# Patient Record
Sex: FEMALE | Race: WHITE | NOT HISPANIC OR LATINO
[De-identification: names, ages, dates, MRNs, and addresses within clinical notes are randomized per-mention and may not be internally consistent; named-entity substitution may affect disease eponyms.]

---

## 2017-01-10 ENCOUNTER — TRANSCRIPTION ENCOUNTER (OUTPATIENT)
Age: 32
End: 2017-01-10

## 2017-05-15 ENCOUNTER — APPOINTMENT (OUTPATIENT)
Dept: OBGYN | Facility: CLINIC | Age: 32
End: 2017-05-15

## 2017-05-15 ENCOUNTER — LABORATORY RESULT (OUTPATIENT)
Age: 32
End: 2017-05-15

## 2017-05-15 VITALS
HEIGHT: 59 IN | WEIGHT: 98.25 LBS | SYSTOLIC BLOOD PRESSURE: 111 MMHG | BODY MASS INDEX: 19.81 KG/M2 | DIASTOLIC BLOOD PRESSURE: 69 MMHG

## 2017-05-22 LAB
CYTOLOGY CVX/VAG DOC THIN PREP: NORMAL
HPV HIGH+LOW RISK DNA PNL CVX: POSITIVE

## 2017-08-14 ENCOUNTER — TRANSCRIPTION ENCOUNTER (OUTPATIENT)
Age: 32
End: 2017-08-14

## 2018-09-27 ENCOUNTER — TRANSCRIPTION ENCOUNTER (OUTPATIENT)
Age: 33
End: 2018-09-27

## 2018-11-19 ENCOUNTER — APPOINTMENT (OUTPATIENT)
Dept: OBGYN | Facility: CLINIC | Age: 33
End: 2018-11-19
Payer: COMMERCIAL

## 2018-11-19 VITALS
SYSTOLIC BLOOD PRESSURE: 113 MMHG | HEIGHT: 59 IN | BODY MASS INDEX: 19.56 KG/M2 | WEIGHT: 97 LBS | HEART RATE: 80 BPM | DIASTOLIC BLOOD PRESSURE: 70 MMHG

## 2018-11-19 DIAGNOSIS — Z87.42 PERSONAL HISTORY OF OTHER DISEASES OF THE FEMALE GENITAL TRACT: ICD-10-CM

## 2018-11-19 PROCEDURE — 99395 PREV VISIT EST AGE 18-39: CPT

## 2018-11-21 LAB — HPV HIGH+LOW RISK DNA PNL CVX: NOT DETECTED

## 2018-11-26 LAB — CYTOLOGY CVX/VAG DOC THIN PREP: NORMAL

## 2019-01-30 ENCOUNTER — TRANSCRIPTION ENCOUNTER (OUTPATIENT)
Age: 34
End: 2019-01-30

## 2019-10-23 ENCOUNTER — TRANSCRIPTION ENCOUNTER (OUTPATIENT)
Age: 34
End: 2019-10-23

## 2019-12-09 ENCOUNTER — LABORATORY RESULT (OUTPATIENT)
Age: 34
End: 2019-12-09

## 2019-12-09 ENCOUNTER — APPOINTMENT (OUTPATIENT)
Dept: OBGYN | Facility: CLINIC | Age: 34
End: 2019-12-09
Payer: COMMERCIAL

## 2019-12-09 VITALS
DIASTOLIC BLOOD PRESSURE: 73 MMHG | WEIGHT: 100 LBS | HEART RATE: 93 BPM | BODY MASS INDEX: 20.16 KG/M2 | SYSTOLIC BLOOD PRESSURE: 108 MMHG | HEIGHT: 59 IN

## 2019-12-09 PROCEDURE — 99395 PREV VISIT EST AGE 18-39: CPT

## 2019-12-09 NOTE — PHYSICAL EXAM
[Awake] : awake [Alert] : alert [Acute Distress] : no acute distress [LAD] : no lymphadenopathy [Thyroid Nodule] : no thyroid nodule [Goiter] : no goiter [Mass] : no breast mass [Axillary LAD] : no axillary lymphadenopathy [Nipple Discharge] : no nipple discharge [Soft] : soft [H/Smegaly] : no hepatosplenomegaly [Tender] : non tender [Distended] : not distended [Oriented x3] : oriented to person, place, and time [Depressed Mood] : not depressed [Flat Affect] : affect not flat [Normal] : cervix [No Bleeding] : there was no active vaginal bleeding [Anteversion] : anteverted [Tenderness] : nontender [Enlarged ___ wks] : not enlarged [Uterine Adnexae] : were not tender and not enlarged [RRR, No Murmurs] : RRR, no murmurs [CTAB] : CTAB

## 2019-12-09 NOTE — CHIEF COMPLAINT
[Annual Visit] : annual visit [FreeTextEntry1] : 35YO P0 LMP 11/18 on LoOvral presents without complaints.

## 2019-12-09 NOTE — HISTORY OF PRESENT ILLNESS
[1 Year Ago] : 1 year ago [Healthy Diet] : a healthy diet [Good] : being in good health [Last Pap ___] : Last cervical pap smear was [unfilled] [Regular Exercise] : regular exercise [Reproductive Age] : is of reproductive age [Contraception] : uses contraception [Sexually Active] : is sexually active [Up to Date] : up to date with ~his/her~ STD screening [Weight Concerns] : no concerns with her weight [Menstrual Problems] : reports normal menses [Pregnancy History] : denies prior pregnancies

## 2019-12-10 LAB — HPV HIGH+LOW RISK DNA PNL CVX: DETECTED

## 2019-12-13 LAB — CYTOLOGY CVX/VAG DOC THIN PREP: NORMAL

## 2020-04-07 ENCOUNTER — RX RENEWAL (OUTPATIENT)
Age: 35
End: 2020-04-07

## 2020-11-10 ENCOUNTER — RX RENEWAL (OUTPATIENT)
Age: 35
End: 2020-11-10

## 2021-05-17 ENCOUNTER — APPOINTMENT (OUTPATIENT)
Dept: OBGYN | Facility: CLINIC | Age: 36
End: 2021-05-17
Payer: COMMERCIAL

## 2021-05-17 ENCOUNTER — LABORATORY RESULT (OUTPATIENT)
Age: 36
End: 2021-05-17

## 2021-05-17 VITALS — WEIGHT: 102 LBS | BODY MASS INDEX: 20.6 KG/M2 | DIASTOLIC BLOOD PRESSURE: 64 MMHG | SYSTOLIC BLOOD PRESSURE: 122 MMHG

## 2021-05-17 VITALS — TEMPERATURE: 97.5 F

## 2021-05-17 PROCEDURE — 99072 ADDL SUPL MATRL&STAF TM PHE: CPT

## 2021-05-17 PROCEDURE — 99395 PREV VISIT EST AGE 18-39: CPT

## 2021-05-17 NOTE — PHYSICAL EXAM
[Appropriately responsive] : appropriately responsive [Alert] : alert [No Acute Distress] : no acute distress [No Lymphadenopathy] : no lymphadenopathy [Regular Rate Rhythm] : regular rate rhythm [No Murmurs] : no murmurs [Clear to Auscultation B/L] : clear to auscultation bilaterally [Soft] : soft [Non-tender] : non-tender [Non-distended] : non-distended [No HSM] : No HSM [No Lesions] : no lesions [No Mass] : no mass [Oriented x3] : oriented x3 [Examination Of The Breasts] : a normal appearance [No Masses] : no breast masses were palpable [Labia Majora] : normal [Labia Minora] : normal [Normal] : normal [Tenderness] : nontender [Normal Position] : in a normal position [Uterine Adnexae] : normal

## 2021-05-17 NOTE — HISTORY OF PRESENT ILLNESS
[Patient reported PAP Smear was normal] : Patient reported PAP Smear was normal [FreeTextEntry1] : 37YO p0 LMP 4/26 on LoOvral presents for checkup without complaints. [PapSmeardate] : 12/19

## 2021-05-25 LAB
CYTOLOGY CVX/VAG DOC THIN PREP: ABNORMAL
HPV HIGH+LOW RISK DNA PNL CVX: DETECTED

## 2021-06-07 ENCOUNTER — APPOINTMENT (OUTPATIENT)
Dept: OBGYN | Facility: CLINIC | Age: 36
End: 2021-06-07
Payer: COMMERCIAL

## 2021-06-07 PROCEDURE — 99072 ADDL SUPL MATRL&STAF TM PHE: CPT

## 2021-06-07 PROCEDURE — 57454 BX/CURETT OF CERVIX W/SCOPE: CPT

## 2021-06-07 NOTE — PROCEDURE
[Colposcopy] : Colposcopy  [Time out performed] : Pre-procedure time out performed.  Patient's name, date of birth and procedure confirmed. [Consent Obtained] : Consent obtained [Risks] : risks [Benefits] : benefits [Alternatives] : alternatives [Patient] : patient [Infection] : infection [Bleeding] : bleeding [Allergic Reaction] : allergic reaction [LGSIL] : LGSIL [HPV High Risk] : HPV high risk [Colposcopy Adequate] : colposcopy adequate [Pap Performed] : pap not performed [SCI Fully Visualized] : SCI not fully visualized [ECC Performed] : ECC performed [Hemostasis Obtained] : Hemostasis obtained [Tolerated Well] : the patient tolerated the procedure well [de-identified] : AWE on bottom lip of cervix [de-identified] : 8 o'clock\par ECC

## 2021-06-11 LAB — CORE LAB BIOPSY: NORMAL

## 2021-12-17 ENCOUNTER — TRANSCRIPTION ENCOUNTER (OUTPATIENT)
Age: 36
End: 2021-12-17

## 2022-05-09 ENCOUNTER — APPOINTMENT (OUTPATIENT)
Dept: OBGYN | Facility: CLINIC | Age: 37
End: 2022-05-09

## 2022-06-20 ENCOUNTER — APPOINTMENT (OUTPATIENT)
Dept: OBGYN | Facility: CLINIC | Age: 37
End: 2022-06-20

## 2022-08-28 ENCOUNTER — NON-APPOINTMENT (OUTPATIENT)
Age: 37
End: 2022-08-28

## 2022-10-24 ENCOUNTER — LABORATORY RESULT (OUTPATIENT)
Age: 37
End: 2022-10-24

## 2022-10-24 ENCOUNTER — APPOINTMENT (OUTPATIENT)
Dept: OBGYN | Facility: CLINIC | Age: 37
End: 2022-10-24

## 2022-10-24 ENCOUNTER — TRANSCRIPTION ENCOUNTER (OUTPATIENT)
Age: 37
End: 2022-10-24

## 2022-10-24 VITALS
HEIGHT: 59 IN | SYSTOLIC BLOOD PRESSURE: 111 MMHG | BODY MASS INDEX: 21.04 KG/M2 | WEIGHT: 104.38 LBS | DIASTOLIC BLOOD PRESSURE: 71 MMHG

## 2022-10-24 DIAGNOSIS — Z01.419 ENCOUNTER FOR GYNECOLOGICAL EXAMINATION (GENERAL) (ROUTINE) W/OUT ABNORMAL FINDINGS: ICD-10-CM

## 2022-10-24 PROCEDURE — 99395 PREV VISIT EST AGE 18-39: CPT

## 2022-10-24 PROCEDURE — 81025 URINE PREGNANCY TEST: CPT

## 2022-10-24 NOTE — HISTORY OF PRESENT ILLNESS
[FreeTextEntry1] : 36YO P0 LMP 9/15 (-)UCG today, trying to conceive for a month or 2 (self D/C'd OCPs in August) no complaints. [PapSmeardate] : 2021

## 2022-10-25 LAB
HCG UR QL: NEGATIVE
QUALITY CONTROL: YES

## 2022-11-01 LAB
CYTOLOGY CVX/VAG DOC THIN PREP: ABNORMAL
HPV HIGH+LOW RISK DNA PNL CVX: DETECTED

## 2022-11-23 ENCOUNTER — APPOINTMENT (OUTPATIENT)
Dept: OBGYN | Facility: CLINIC | Age: 37
End: 2022-11-23

## 2022-11-23 VITALS — SYSTOLIC BLOOD PRESSURE: 122 MMHG | DIASTOLIC BLOOD PRESSURE: 80 MMHG

## 2022-11-23 DIAGNOSIS — R87.612 LOW GRADE SQUAMOUS INTRAEPITHELIAL LESION ON CYTOLOGIC SMEAR OF CERVIX (LGSIL): ICD-10-CM

## 2022-11-23 DIAGNOSIS — B97.7 PAPILLOMAVIRUS AS THE CAUSE OF DISEASES CLASSIFIED ELSEWHERE: ICD-10-CM

## 2022-11-23 LAB
HCG UR QL: NEGATIVE
QUALITY CONTROL: YES

## 2022-11-23 PROCEDURE — 57454 BX/CURETT OF CERVIX W/SCOPE: CPT

## 2022-11-23 PROCEDURE — 81025 URINE PREGNANCY TEST: CPT

## 2022-11-23 NOTE — PROCEDURE
[Colposcopy] : Colposcopy  [Time out performed] : Pre-procedure time out performed.  Patient's name, date of birth and procedure confirmed. [Consent Obtained] : Consent obtained [Risks] : risks [Benefits] : benefits [Alternatives] : alternatives [Patient] : patient [Infection] : infection [Bleeding] : bleeding [Allergic Reaction] : allergic reaction [LGSIL] : LGSIL [HPV High Risk] : HPV high risk [No Premedication] : no premedication [Colposcopy Adequate] : colposcopy adequate [Pap Performed] : pap not performed [SCI Fully Visualized] : SCI not fully visualized [ECC Performed] : ECC performed [Lesion] : lesion seen [Biopsy] : biopsy taken [Hemostasis Obtained] : Hemostasis obtained [Tolerated Well] : the patient tolerated the procedure well [de-identified] : 1 [de-identified] : ECC

## 2022-11-30 ENCOUNTER — NON-APPOINTMENT (OUTPATIENT)
Age: 37
End: 2022-11-30

## 2022-11-30 LAB — CORE LAB BIOPSY: NORMAL

## 2022-12-19 ENCOUNTER — NON-APPOINTMENT (OUTPATIENT)
Age: 37
End: 2022-12-19

## 2023-01-27 ENCOUNTER — APPOINTMENT (OUTPATIENT)
Dept: OBGYN | Facility: CLINIC | Age: 38
End: 2023-01-27
Payer: COMMERCIAL

## 2023-01-27 VITALS — BODY MASS INDEX: 20.02 KG/M2 | SYSTOLIC BLOOD PRESSURE: 102 MMHG | WEIGHT: 99.13 LBS | DIASTOLIC BLOOD PRESSURE: 67 MMHG

## 2023-01-27 DIAGNOSIS — N64.4 MASTODYNIA: ICD-10-CM

## 2023-01-27 DIAGNOSIS — R53.83 OTHER FATIGUE: ICD-10-CM

## 2023-01-27 DIAGNOSIS — N92.6 IRREGULAR MENSTRUATION, UNSPECIFIED: ICD-10-CM

## 2023-01-27 PROCEDURE — 99213 OFFICE O/P EST LOW 20 MIN: CPT | Mod: 25

## 2023-01-27 PROCEDURE — 76830 TRANSVAGINAL US NON-OB: CPT

## 2023-01-27 NOTE — PROCEDURE
[Transvaginal OB Sonogram] : Transvaginal OB Sonogram [Intrauterine Pregnancy] : intrauterine pregnancy [Yolk Sac] : yolk sac present [Fetal Heart] : fetal heart present [CRL: ___ (mm)] : CRL - [unfilled]Umm [Date: ___] : Date: [unfilled] [Current GA by Sonogram: ___ (wks)] : Current GA by Sonogram: [unfilled]Uwks [___ day(s)] : [unfilled] days [Transvaginal OB Sonogram WNL] : Transvaginal OB Sonogram WNL [FreeTextEntry1] : c/w 25d cycle LMP (should be 9+3)

## 2023-01-27 NOTE — PHYSICAL EXAM
[Labia Majora] : normal [Labia Minora] : normal [Normal] : normal [Enlarged ___ wks] : enlarged [unfilled] ~Uweeks [Anteversion] : anteverted [Uterine Adnexae] : normal

## 2023-02-06 ENCOUNTER — APPOINTMENT (OUTPATIENT)
Dept: ANTEPARTUM | Facility: CLINIC | Age: 38
End: 2023-02-06
Payer: COMMERCIAL

## 2023-02-06 ENCOUNTER — ASOB RESULT (OUTPATIENT)
Age: 38
End: 2023-02-06

## 2023-02-06 ENCOUNTER — APPOINTMENT (OUTPATIENT)
Dept: OBGYN | Facility: CLINIC | Age: 38
End: 2023-02-06
Payer: COMMERCIAL

## 2023-02-06 DIAGNOSIS — Z34.00 ENCOUNTER FOR SUPERVISION OF NORMAL FIRST PREGNANCY, UNSPECIFIED TRIMESTER: ICD-10-CM

## 2023-02-06 PROCEDURE — 0501F PRENATAL FLOW SHEET: CPT

## 2023-02-06 PROCEDURE — 76801 OB US < 14 WKS SINGLE FETUS: CPT

## 2023-02-06 PROCEDURE — 76813 OB US NUCHAL MEAS 1 GEST: CPT | Mod: 59

## 2023-02-07 LAB
ABO + RH PNL BLD: NORMAL
BASOPHILS # BLD AUTO: 0.06 K/UL
BASOPHILS NFR BLD AUTO: 0.6 %
BLD GP AB SCN SERPL QL: NORMAL
C TRACH RRNA SPEC QL NAA+PROBE: NOT DETECTED
CMV IGG SERPL QL: <0.2 U/ML
CMV IGG SERPL-IMP: NEGATIVE
EOSINOPHIL # BLD AUTO: 0.15 K/UL
EOSINOPHIL NFR BLD AUTO: 1.4 %
HBV SURFACE AG SER QL: NONREACTIVE
HCT VFR BLD CALC: 40.3 %
HCV AB SER QL: NONREACTIVE
HCV S/CO RATIO: 0.13 S/CO
HGB A MFR BLD: 97.4 %
HGB A2 MFR BLD: 2.6 %
HGB BLD-MCNC: 12.9 G/DL
HGB FRACT BLD-IMP: NORMAL
HIV1+2 AB SPEC QL IA.RAPID: NONREACTIVE
IMM GRANULOCYTES NFR BLD AUTO: 0.4 %
LYMPHOCYTES # BLD AUTO: 2.44 K/UL
LYMPHOCYTES NFR BLD AUTO: 22.7 %
MAN DIFF?: NORMAL
MCHC RBC-ENTMCNC: 29.6 PG
MCHC RBC-ENTMCNC: 32 GM/DL
MCV RBC AUTO: 92.4 FL
MEV IGG FLD QL IA: 217 AU/ML
MEV IGG+IGM SER-IMP: POSITIVE
MONOCYTES # BLD AUTO: 0.65 K/UL
MONOCYTES NFR BLD AUTO: 6 %
N GONORRHOEA RRNA SPEC QL NAA+PROBE: NOT DETECTED
NEUTROPHILS # BLD AUTO: 7.41 K/UL
NEUTROPHILS NFR BLD AUTO: 68.9 %
PLATELET # BLD AUTO: 397 K/UL
RBC # BLD: 4.36 M/UL
RBC # FLD: 13.1 %
RUBV IGG FLD-ACNC: 7.4 INDEX
RUBV IGG SER-IMP: POSITIVE
SOURCE AMPLIFICATION: NORMAL
T PALLIDUM AB SER QL IA: NEGATIVE
TSH SERPL-ACNC: 0.63 UIU/ML
VZV AB TITR SER: POSITIVE
VZV IGG SER IF-ACNC: 965.1 INDEX
WBC # FLD AUTO: 10.75 K/UL

## 2023-02-10 LAB
B19V IGG SER QL IA: 4.45 INDEX
B19V IGG+IGM SER-IMP: NORMAL
B19V IGG+IGM SER-IMP: POSITIVE
B19V IGM FLD-ACNC: 0.19 INDEX
B19V IGM SER-ACNC: NEGATIVE
BACTERIA UR CULT: NORMAL
LEAD BLD-MCNC: <1 UG/DL

## 2023-02-13 LAB — CFTR MUT TESTED BLD/T: NEGATIVE

## 2023-02-14 LAB
AR GENE MUT ANL BLD/T: NORMAL
FMR1 GENE MUT ANL BLD/T: NORMAL

## 2023-02-15 ENCOUNTER — NON-APPOINTMENT (OUTPATIENT)
Age: 38
End: 2023-02-15

## 2023-02-21 ENCOUNTER — ASOB RESULT (OUTPATIENT)
Age: 38
End: 2023-02-21

## 2023-02-21 ENCOUNTER — APPOINTMENT (OUTPATIENT)
Dept: MATERNAL FETAL MEDICINE | Facility: CLINIC | Age: 38
End: 2023-02-21
Payer: COMMERCIAL

## 2023-02-21 PROCEDURE — 99204 OFFICE O/P NEW MOD 45 MIN: CPT | Mod: 95

## 2023-02-27 ENCOUNTER — NON-APPOINTMENT (OUTPATIENT)
Age: 38
End: 2023-02-27

## 2023-02-27 ENCOUNTER — EMERGENCY (EMERGENCY)
Facility: HOSPITAL | Age: 38
LOS: 1 days | Discharge: HOME | End: 2023-02-27
Attending: EMERGENCY MEDICINE
Payer: COMMERCIAL

## 2023-02-27 VITALS
RESPIRATION RATE: 18 BRPM | HEART RATE: 100 BPM | SYSTOLIC BLOOD PRESSURE: 107 MMHG | HEIGHT: 59 IN | DIASTOLIC BLOOD PRESSURE: 70 MMHG | OXYGEN SATURATION: 99 % | TEMPERATURE: 98 F | WEIGHT: 100.09 LBS

## 2023-02-27 LAB
ALBUMIN SERPL ELPH-MCNC: 4.5 G/DL — SIGNIFICANT CHANGE UP (ref 3.3–5)
ALP SERPL-CCNC: 69 U/L — SIGNIFICANT CHANGE UP (ref 40–120)
ALT FLD-CCNC: 12 U/L — SIGNIFICANT CHANGE UP (ref 10–45)
ANION GAP SERPL CALC-SCNC: 14 MMOL/L — SIGNIFICANT CHANGE UP (ref 5–17)
APPEARANCE UR: CLEAR — SIGNIFICANT CHANGE UP
APTT BLD: 23.3 SEC — LOW (ref 27.5–35.5)
AST SERPL-CCNC: 16 U/L — SIGNIFICANT CHANGE UP (ref 10–40)
BASE EXCESS BLDV CALC-SCNC: -2.8 MMOL/L — LOW (ref -2–3)
BASOPHILS # BLD AUTO: 0.01 K/UL — SIGNIFICANT CHANGE UP (ref 0–0.2)
BASOPHILS NFR BLD AUTO: 0.1 % — SIGNIFICANT CHANGE UP (ref 0–2)
BILIRUB SERPL-MCNC: 0.3 MG/DL — SIGNIFICANT CHANGE UP (ref 0.2–1.2)
BILIRUB UR-MCNC: NEGATIVE — SIGNIFICANT CHANGE UP
BLD GP AB SCN SERPL QL: NEGATIVE — SIGNIFICANT CHANGE UP
BUN SERPL-MCNC: 9 MG/DL — SIGNIFICANT CHANGE UP (ref 7–23)
CA-I SERPL-SCNC: 1.22 MMOL/L — SIGNIFICANT CHANGE UP (ref 1.15–1.33)
CALCIUM SERPL-MCNC: 9.4 MG/DL — SIGNIFICANT CHANGE UP (ref 8.4–10.5)
CHLORIDE BLDV-SCNC: 102 MMOL/L — SIGNIFICANT CHANGE UP (ref 96–108)
CHLORIDE SERPL-SCNC: 102 MMOL/L — SIGNIFICANT CHANGE UP (ref 96–108)
CO2 BLDV-SCNC: 23 MMOL/L — SIGNIFICANT CHANGE UP (ref 22–26)
CO2 SERPL-SCNC: 20 MMOL/L — LOW (ref 22–31)
COLOR SPEC: SIGNIFICANT CHANGE UP
CREAT SERPL-MCNC: 0.49 MG/DL — LOW (ref 0.5–1.3)
DIFF PNL FLD: NEGATIVE — SIGNIFICANT CHANGE UP
EGFR: 124 ML/MIN/1.73M2 — SIGNIFICANT CHANGE UP
EOSINOPHIL # BLD AUTO: 0.01 K/UL — SIGNIFICANT CHANGE UP (ref 0–0.5)
EOSINOPHIL NFR BLD AUTO: 0.1 % — SIGNIFICANT CHANGE UP (ref 0–6)
FLUAV AG NPH QL: SIGNIFICANT CHANGE UP
FLUBV AG NPH QL: SIGNIFICANT CHANGE UP
GAS PNL BLDV: 132 MMOL/L — LOW (ref 136–145)
GAS PNL BLDV: SIGNIFICANT CHANGE UP
GAS PNL BLDV: SIGNIFICANT CHANGE UP
GLUCOSE BLDV-MCNC: 100 MG/DL — HIGH (ref 70–99)
GLUCOSE SERPL-MCNC: 103 MG/DL — HIGH (ref 70–99)
GLUCOSE UR QL: NEGATIVE — SIGNIFICANT CHANGE UP
HCO3 BLDV-SCNC: 22 MMOL/L — SIGNIFICANT CHANGE UP (ref 22–29)
HCT VFR BLD CALC: 36.4 % — SIGNIFICANT CHANGE UP (ref 34.5–45)
HCT VFR BLDA CALC: 38 % — SIGNIFICANT CHANGE UP (ref 34.5–46.5)
HGB BLD CALC-MCNC: 12.6 G/DL — SIGNIFICANT CHANGE UP (ref 11.7–16.1)
HGB BLD-MCNC: 12.5 G/DL — SIGNIFICANT CHANGE UP (ref 11.5–15.5)
IMM GRANULOCYTES NFR BLD AUTO: 0.3 % — SIGNIFICANT CHANGE UP (ref 0–0.9)
INR BLD: 1 RATIO — SIGNIFICANT CHANGE UP (ref 0.88–1.16)
KETONES UR-MCNC: ABNORMAL
LACTATE BLDV-MCNC: 1.2 MMOL/L — SIGNIFICANT CHANGE UP (ref 0.5–2)
LEUKOCYTE ESTERASE UR-ACNC: NEGATIVE — SIGNIFICANT CHANGE UP
LYMPHOCYTES # BLD AUTO: 0.8 K/UL — LOW (ref 1–3.3)
LYMPHOCYTES # BLD AUTO: 6.8 % — LOW (ref 13–44)
MAGNESIUM SERPL-MCNC: 2.3 MG/DL — SIGNIFICANT CHANGE UP (ref 1.6–2.6)
MCHC RBC-ENTMCNC: 30.6 PG — SIGNIFICANT CHANGE UP (ref 27–34)
MCHC RBC-ENTMCNC: 34.3 GM/DL — SIGNIFICANT CHANGE UP (ref 32–36)
MCV RBC AUTO: 89.2 FL — SIGNIFICANT CHANGE UP (ref 80–100)
MONOCYTES # BLD AUTO: 0.41 K/UL — SIGNIFICANT CHANGE UP (ref 0–0.9)
MONOCYTES NFR BLD AUTO: 3.5 % — SIGNIFICANT CHANGE UP (ref 2–14)
NEUTROPHILS # BLD AUTO: 10.55 K/UL — HIGH (ref 1.8–7.4)
NEUTROPHILS NFR BLD AUTO: 89.2 % — HIGH (ref 43–77)
NITRITE UR-MCNC: NEGATIVE — SIGNIFICANT CHANGE UP
NRBC # BLD: 0 /100 WBCS — SIGNIFICANT CHANGE UP (ref 0–0)
PCO2 BLDV: 37 MMHG — LOW (ref 39–42)
PH BLDV: 7.38 — SIGNIFICANT CHANGE UP (ref 7.32–7.43)
PH UR: 5.5 — SIGNIFICANT CHANGE UP (ref 5–8)
PHOSPHATE SERPL-MCNC: 3.9 MG/DL — SIGNIFICANT CHANGE UP (ref 2.5–4.5)
PLATELET # BLD AUTO: 358 K/UL — SIGNIFICANT CHANGE UP (ref 150–400)
PO2 BLDV: 28 MMHG — SIGNIFICANT CHANGE UP (ref 25–45)
POTASSIUM BLDV-SCNC: 3.6 MMOL/L — SIGNIFICANT CHANGE UP (ref 3.5–5.1)
POTASSIUM SERPL-MCNC: 3.7 MMOL/L — SIGNIFICANT CHANGE UP (ref 3.5–5.3)
POTASSIUM SERPL-SCNC: 3.7 MMOL/L — SIGNIFICANT CHANGE UP (ref 3.5–5.3)
PROT SERPL-MCNC: 7.4 G/DL — SIGNIFICANT CHANGE UP (ref 6–8.3)
PROT UR-MCNC: NEGATIVE — SIGNIFICANT CHANGE UP
PROTHROM AB SERPL-ACNC: 11.6 SEC — SIGNIFICANT CHANGE UP (ref 10.5–13.4)
RBC # BLD: 4.08 M/UL — SIGNIFICANT CHANGE UP (ref 3.8–5.2)
RBC # FLD: 13.1 % — SIGNIFICANT CHANGE UP (ref 10.3–14.5)
RH IG SCN BLD-IMP: POSITIVE — SIGNIFICANT CHANGE UP
RSV RNA NPH QL NAA+NON-PROBE: SIGNIFICANT CHANGE UP
SAO2 % BLDV: 51.9 % — LOW (ref 67–88)
SARS-COV-2 RNA SPEC QL NAA+PROBE: SIGNIFICANT CHANGE UP
SODIUM SERPL-SCNC: 136 MMOL/L — SIGNIFICANT CHANGE UP (ref 135–145)
SP GR SPEC: 1.01 — LOW (ref 1.01–1.02)
TROPONIN T, HIGH SENSITIVITY RESULT: <6 NG/L — SIGNIFICANT CHANGE UP (ref 0–51)
TSH SERPL-MCNC: 0.73 UIU/ML — SIGNIFICANT CHANGE UP (ref 0.27–4.2)
UROBILINOGEN FLD QL: NEGATIVE — SIGNIFICANT CHANGE UP
WBC # BLD: 11.82 K/UL — HIGH (ref 3.8–10.5)
WBC # FLD AUTO: 11.82 K/UL — HIGH (ref 3.8–10.5)

## 2023-02-27 PROCEDURE — 99244 OFF/OP CNSLTJ NEW/EST MOD 40: CPT | Mod: GC

## 2023-02-27 PROCEDURE — 99223 1ST HOSP IP/OBS HIGH 75: CPT

## 2023-02-27 RX ORDER — SODIUM CHLORIDE 9 MG/ML
1000 INJECTION INTRAMUSCULAR; INTRAVENOUS; SUBCUTANEOUS
Refills: 0 | Status: DISCONTINUED | OUTPATIENT
Start: 2023-02-27 | End: 2023-02-28

## 2023-02-27 RX ORDER — SODIUM CHLORIDE 9 MG/ML
1000 INJECTION INTRAMUSCULAR; INTRAVENOUS; SUBCUTANEOUS ONCE
Refills: 0 | Status: COMPLETED | OUTPATIENT
Start: 2023-02-27 | End: 2023-02-27

## 2023-02-27 RX ADMIN — SODIUM CHLORIDE 1000 MILLILITER(S): 9 INJECTION INTRAMUSCULAR; INTRAVENOUS; SUBCUTANEOUS at 14:22

## 2023-02-27 RX ADMIN — SODIUM CHLORIDE 1000 MILLILITER(S): 9 INJECTION INTRAMUSCULAR; INTRAVENOUS; SUBCUTANEOUS at 18:08

## 2023-02-27 RX ADMIN — SODIUM CHLORIDE 100 MILLILITER(S): 9 INJECTION INTRAMUSCULAR; INTRAVENOUS; SUBCUTANEOUS at 20:17

## 2023-02-27 NOTE — CONSULT NOTE ADULT - SUBJECTIVE AND OBJECTIVE BOX
ALINA LEGGETT  38y  Female [id number]    HPI: 37yo  at GA 15wk presented to ED for evaluation of syncope. Pt had an episode of lightheadedness followed by LOC for 10-20seconds this AM. Pt reported feeling hot after regaining consciousness.  Pt does not yet feel fetal movements, denies contractions or abdominal pain, denies leakage of fluids or vaginal bleeding. Patient denies chest pain, shortness of breath, fevers, chills, nausea, vomiting, diarrhea. No changes in bowel or bladder habits. No sick contacts.        Name of GYN Physician: Dr Frank Walden MD    OBHx: primigravida  GYNHx: h/o abd pap, negative repeats, denies fibroids, cysts, STDs  PMH: childhood asthma  PSH: denies  All: PNC (hives as a child)  Soc: no substance use, anxiety/depression        Vital Signs Last 24 Hrs  T(C): 36.4 (2023 12:50), Max: 36.4 (2023 12:50)  T(F): 97.6 (2023 12:50), Max: 97.6 (2023 12:50)  HR: 100 (2023 12:50) (100 - 100)  BP: 107/70 (2023 12:50) (107/70 - 107/70)  BP(mean): --  RR: 18 (2023 12:50) (18 - 18)  SpO2: 99% (2023 12:50) (99% - 99%)    Parameters below as of 2023 12:50  Patient On (Oxygen Delivery Method): room air        Physical Exam:   General: laying comfortably in bed, NAD   HEENT: neck supple, full ROM  Back: No CVA tenderness  Abd: Soft, non-tender, non-distended.  NR NG. +-160 bpm on sono    Ext: non-tender b/l, no edema     LABS:                         12.5   11.82 )-----------( 358      ( 2023 14:56 )             36.4     CAPILLARY BLOOD GLUCOSE      POCT Blood Glucose.: 97 mg/dL (2023 14:35)   ALINA LEGGETT  38y  Female [id number]    HPI: 37yo  at GA 15wk0d presented to ED for evaluation of syncope. Pt had an episode of lightheadedness followed by LOC for 10-20seconds this AM. Pt reported feeling hot after regaining consciousness.  Pt does not yet feel fetal movements, denies contractions or abdominal pain, denies leakage of fluids or vaginal bleeding. Patient denies chest pain, shortness of breath, fevers, chills, nausea, vomiting, diarrhea. No changes in bowel or bladder habits. No sick contacts.        Name of GYN Physician: Dr Frank Walden MD    OBHx: primigravida  GYNHx: h/o abd pap, negative repeats, denies fibroids, cysts, STDs  PMH: childhood asthma  PSH: denies  All: PNC (hives as a child)  Soc: no substance use, anxiety/depression        Vital Signs Last 24 Hrs  T(C): 36.4 (2023 12:50), Max: 36.4 (2023 12:50)  T(F): 97.6 (2023 12:50), Max: 97.6 (2023 12:50)  HR: 100 (2023 12:50) (100 - 100)  BP: 107/70 (2023 12:50) (107/70 - 107/70)  BP(mean): --  RR: 18 (2023 12:50) (18 - 18)  SpO2: 99% (2023 12:50) (99% - 99%)    Parameters below as of 2023 12:50  Patient On (Oxygen Delivery Method): room air        Physical Exam:   General: laying comfortably in bed, NAD   HEENT: neck supple, full ROM  Back: No CVA tenderness  Abd: Soft, non-tender, non-distended.  NR NG. +-160 bpm on sono    Ext: non-tender b/l, no edema     LABS:                         12.5   11.82 )-----------( 358      ( 2023 14:56 )             36.4     CAPILLARY BLOOD GLUCOSE      POCT Blood Glucose.: 97 mg/dL (2023 14:35)   ALINA LEGGETT  38y  Female [id number]    HPI: 39yo  at GA 15wk0d presented to ED for evaluation of syncope. Pt had an episode of sudden onset dizziness followed by LOC for 10-20seconds this AM. Reports running errands and not having any breakfast prior to this episode. Denies any h/o of prior syncopal smxs in or outside of pregnancy. Pt endorses feeling hot after regaining consciousness.  Pt does not yet feel fetal movements, denies contractions or abdominal pain, denies leakage of fluids or vaginal bleeding. Patient denies chest pain, shortness of breath, fevers, chills, nausea, vomiting, diarrhea. No changes in bowel or bladder habits. No sick contacts.      PNC uncomplicated    Name of GYN Physician: Dr Frank Walden MD    OBHx: primigravida  GYNHx: h/o abd pap, negative repeats, denies fibroids, cysts, STDs  PMH: childhood asthma  PSH: denies  All: PNC (hives as a child)  Soc: no substance use, anxiety/depression        Vital Signs Last 24 Hrs  T(C): 36.4 (2023 12:50), Max: 36.4 (2023 12:50)  T(F): 97.6 (2023 12:50), Max: 97.6 (2023 12:50)  HR: 100 (2023 12:50) (100 - 100)  BP: 107/70 (2023 12:50) (107/70 - 107/70)  BP(mean): --  RR: 18 (2023 12:50) (18 - 18)  SpO2: 99% (2023 12:50) (99% - 99%)    Parameters below as of 2023 12:50  Patient On (Oxygen Delivery Method): room air        Physical Exam:   General: laying comfortably in bed, NAD   HEENT: neck supple, full ROM  Back: No CVA tenderness  Abd: Soft, non-tender, non-distended.  NR NG. +-160 bpm on sono    Ext: non-tender b/l, no edema     LABS:                         12.5   11.82 )-----------( 358      ( 2023 14:56 )             36.4     CAPILLARY BLOOD GLUCOSE      POCT Blood Glucose.: 97 mg/dL (2023 14:35)

## 2023-02-27 NOTE — ED CDU PROVIDER INITIAL DAY NOTE - ATTENDING CONTRIBUTION TO CARE
RGUJRAL 38-year-old female history of  currently 15 weeks pregnant naturally conceived pregnancy with no complications presents today with episode of lightheadedness and syncope.  Patient states that she felt dizzy and shortly after lost consciousness while seated for 15 seconds.  Patient was with her  who attended to her immediately.  Patient denies any recent illness cough URI fever or chills.  Denies any chest pain or shortness of breath abdominal pain vaginal bleeding.  Patient states she had not eaten in the morning.  Patient states since the episode she feels at baseline and has no acute complaints.  On exam, Patient is awake,alert,oriented x 3. Patient is well appearing and in no acute distress. Patient's chest is clear to ausculation, +s1s2. Abdomen is soft nd/nt +BS. Extremity with no swelling or calf tenderness. Pt neuro intact. PERRL 4mm b/l. Will obtain labs, EKG reviewed.  Denies any headache blood pressure stable no visual changes. DDx vasovagal syncope, ro arrhthymias. Recommend CDU for telemetry monitoring and echo.  Patient would like to speak to her OB/GYN team for recommendations. RGUJRAL 38-year-old female history of  currently 15 weeks pregnant naturally conceived pregnancy with no complications presents today with episode of lightheadedness and syncope.  Patient states that she felt dizzy and shortly after lost consciousness while seated for 15 seconds.  Patient was with her  who attended to her immediately.  Patient denies any recent illness cough URI fever or chills.  Denies any chest pain or shortness of breath abdominal pain vaginal bleeding.  Patient states she had not eaten in the morning.  Patient states since the episode she feels at baseline and has no acute complaints.  On exam, Patient is awake,alert,oriented x 3. Patient is well appearing and in no acute distress. Patient's chest is clear to ausculation, +s1s2. Abdomen is soft nd/nt +BS. Extremity with no swelling or calf tenderness. Pt neuro intact. PERRL 4mm b/l.   Results reviewed. Admit to CDU for telemetry monitoring and ECHO.

## 2023-02-27 NOTE — ED PROVIDER NOTE - ATTENDING CONTRIBUTION TO CARE
RGUJRAL 38-year-old female history of  currently 15 weeks pregnant naturally conceived pregnancy with no complications presents today with episode of lightheadedness and syncope.  Patient states that she felt dizzy and shortly after lost consciousness while seated for 15 seconds.  Patient was with her  who attended to her immediately.  Patient denies any recent illness cough URI fever or chills.  Denies any chest pain or shortness of breath abdominal pain vaginal bleeding.  Patient states she had not eaten in the morning.  Patient states since the episode she feels at baseline and has no acute complaints.  On exam, Patient is awake,alert,oriented x 3. Patient is well appearing and in no acute distress. Patient's chest is clear to ausculation, +s1s2. Abdomen is soft nd/nt +BS. Extremity with no swelling or calf tenderness. Pt neuro intact. PERRL 4mm b/l. Will obtain labs, EKG reviewed.  Denies any headache blood pressure stable no visual changes. DDx vasovagal syncope, ro arrhthymias. Recommend CDU for telemetry monitoring and echo.  Patient would like to speak to her OB/GYN team for recommendations.

## 2023-02-27 NOTE — ED CDU PROVIDER DISPOSITION NOTE - CARE PROVIDER_API CALL
Frank Walden)  Obstetrics and Gynecology  [address], [address]  [place] [address]  Phone: [phone]  Fax: [phone]  Follow Up Time: 1-3 Days

## 2023-02-27 NOTE — ED PROVIDER NOTE - OBJECTIVE STATEMENT
38-year-old female  pmhx of 15-week pregnancy comes to ED w/ syncope.  Patient woke up around 630 went to the kitchen was making coffee stood for about 10 to 15 minutes felt dizzy all of a sudden and then lost consciousness,  was nearby and was able to guide the patient to the chair then to the floor. Their pain/symptom is moderate to severe, 1 episode, mediating with rest. Started randomly. Denies falls, trauma, chest pain, shortness of breath, abdominal pain, nausea, vomiting, diarrhea, urinary symptoms, stool symptoms, skin changes.

## 2023-02-27 NOTE — ED PROCEDURE NOTE - PROCEDURE ADDITIONAL DETAILS
Emergency Department Focused Ultrasound performed at patient's bedside for educational purposes. The study will have a follow up study performed or was performed in the direct supervision of an ultrasound trained attending.     . f/u bedside doppler

## 2023-02-27 NOTE — ED CDU PROVIDER INITIAL DAY NOTE - PROGRESS NOTE DETAILS
CDU PROGRESS NOTE MELANIA TAVARES: Received pt at 1900 sign-out. Case/plan reviewed. VSS. Pt resting in stretcher in NAD. Pt ate dinner. Ambulatory around unit with steady gait. S1 S2 noted, RRR, lungs CTA b/l, BS x4 with soft, nontender abdomen. Pt without complaints. Will continue to monitor overnight. GYN recs appreciated.

## 2023-02-27 NOTE — ED PROVIDER NOTE - RAPID ASSESSMENT
Dr. Corona Note: lightheaded, sat down, passed out 15s, resolved when lying flat by , no post-ictal confusion.  15wks pregnant.     Patient was rapidly assessed via a telemedicine and/or role of Quick Triage Doctor; a limited history, physical exam and assessment was performed. The patient will be seen and further evaluated in the main emergency department. The remainder of care and evaluation will be conducted by the primary emergency medicine team. Receiving team will follow up on labs, imaging and serially reassess patient as indicated. All further decisions regarding patient care, evaluation and disposition are at the discretion of the receiving primary emergency department team.

## 2023-02-27 NOTE — ED PROVIDER NOTE - CLINICAL SUMMARY MEDICAL DECISION MAKING FREE TEXT BOX
Impression: 38-year-old female  pmhx of 15-week pregnancy comes to ED w/ syncope.  Their sudden symptoms and benign exam findings are concerning for arrhythmia, vasovagal syncope, dehydration.  Will evaluate further cardiac causes.  Plan to observe in the CDU.    Ordered labs, imaging, medications for diagnosis, management, and treatment.

## 2023-02-27 NOTE — ED CDU PROVIDER DISPOSITION NOTE - PATIENT PORTAL LINK FT
You can access the FollowMyHealth Patient Portal offered by [facility] by registering at the following website: [email or web address]. By joining [employer]’s FollowMyHealth portal, you will also be able to view your health information using other applications (apps) compatible with our system.

## 2023-02-27 NOTE — ED CDU PROVIDER INITIAL DAY NOTE - OBJECTIVE STATEMENT
39 yo F with no reported PMH of 15-week pregnancy comes to ED w/ syncope.  Patient woke up around 630 went to the kitchen was making coffee stood for about 10 to 15 minutes felt dizzy all of a sudden and then lost consciousness,  was nearby and was able to guide the patient to the chair then to the floor. Dizziness followed by LOC for 10-20 seconds this AM. Reports running errands and not having any breakfast prior to this episode. Denies any h/o of prior syncopal sxs in or outside of pregnancy. Denies nausea, vomiting, fever, chills, chest pain, SOB, cough, abd pain, diarrhea, headache, changes in vision/speech, urinary complaints, vaginal complaints, pelvic pain, back/neck pain. 37 yo F with no reported PMH 15 weeks pregnant presents sp syncopal episode x today.  Patient woke up around 630AM went to the kitchen was making coffee stood for about 10 to 15 minutes felt dizzy all of a sudden and then lost consciousness,  was nearby and was able to guide the patient to the chair then to the floor. Dizziness followed by LOC for 10-20 seconds this AM. Reports running errands and not having any breakfast prior to this episode. Denies any h/o of prior syncopal sxs in or outside of pregnancy. Denies nausea, vomiting, fever, chills, chest pain, SOB, cough, abd pain, diarrhea, headache, changes in vision/speech, urinary complaints, vaginal complaints, pelvic pain, back/neck pain.

## 2023-02-27 NOTE — ED ADULT TRIAGE NOTE - CHIEF COMPLAINT QUOTE
2022  15 weeks preg Dr Walden GYN   C/o feeling dizzy Syncopy witnessed per spouse Denies numbness or weakness Denies vag bleeding or abd pain BEFAST NEG C/o " Tired." < however worsening feeling today

## 2023-02-27 NOTE — ED CDU PROVIDER DISPOSITION NOTE - NSFOLLOWUPINSTRUCTIONS_ED_ALL_ED_FT
You were seen and evaluated in the ED for syncope.   Please make sure to follow up with your primary care doctor within 1-2 days and with the OBGYN specialist. Bring a copy of all of your results with you to your follow up appointments.   Return to the ER as discussed if you develop any new or worsening symptoms.     Make sure to rest and hydrate.   Continue your prenatals. Please rest and stay well hydrated.     Please make sure to follow up with your primary care doctor and your OBGYN Dr. Walden at upcoming appointment.   Bring a copy of all of your results with you to your follow up appointments.     Return to the ER as discussed if you develop any new or worsening symptoms including dizziness, passing out, chest pain, difficulty breathing, abdominal pain, vaginal bleeding, or any other concerns.

## 2023-02-27 NOTE — CONSULT NOTE ADULT - ASSESSMENT
37yo  at GA 15wk presents for evaluation of syncope with LOC. + presyncopal symptoms suggestive of vasovagal syncope in pregnancy. VSS and patient currently w/out complaints, benign physical exam. Patient overall doing well:    - workup for syncope per ed  - recommend ivh and vital signs monitoring  - reviewed fall precautions w/ patient  - gyn team to follow along, spectra [id number] pager [phone]    Amyeo Afroz Jereen, PGY-2  d/w  _______  37yo  at GA 15wk presents for evaluation of syncope with LOC. + presyncopal symptoms suggestive of vasovagal syncope in pregnancy. VSS and patient currently w/out complaints, benign physical exam. Patient overall doing well:    - workup for syncope per ed, recommend ua/ucx given wbc higher end of normal  - recommend ivh and vital signs monitoring  - reviewed fall precautions w/ patient  - gyn team to follow along, spectra [id number] pager [phone]    Amyeo Afroz Jereen, PGY-2  d/w  _______  37yo  at GA 15wk presents for evaluation of syncope with LOC. + presyncopal symptoms suggestive of vasovagal syncope in pregnancy. VSS and patient currently w/out complaints, benign physical exam. Patient overall doing well:    - workup for syncope per ed, recommend ua/ucx  - recommend ivh and vital signs monitoring  - reviewed fall precautions w/ patient  - gyn team to follow along, spectra [id number] pager [phone]  - will f/u TTE    Amyeo Afroz Jereen, PGY-2  d/w Dr Bailey 37yo  at GA 15wk0d presents for evaluation of syncope with LOC. + presyncopal symptoms suggestive of vasovagal syncope in pregnancy. VSS and patient currently w/out complaints, benign physical exam. Patient overall doing well:    - workup for syncope per ed, recommend ua/ucx  - recommend ivh and vital signs monitoring  - reviewed fall precautions w/ patient  - gyn team to follow along, spectra [id number] pager [phone]  - will f/u TTE    Amyeo Afroz Jereen, PGY-2  Seen and discussed Dr Bailey 39yo  at GA 15wk0d presents for evaluation of syncope with LOC. + presyncopal symptoms suggestive of vasovagal syncope in pregnancy. VSS and patient currently w/out complaints, benign physical exam. Patient overall doing well:    - workup for syncope per ed, recommend ua/ucx  - recommend ivh and vital signs monitoring, daily FH checks  - reviewed fall precautions w/ patient  - gyn team to follow along, spectra [id number] pager [phone]  - will f/u TTE    Amyeo Afroz Jereen, PGY-2  Seen and discussed Dr Bailey 39yo  at GA 15wk0d presents for evaluation of syncope with LOC. + presyncopal symptoms suggestive of vasovagal syncope in pregnancy. VSS and patient currently w/out complaints, benign physical exam. Patient overall doing well:    - workup for syncope per ed, recommend ua/ucx  - recommend ivh and vital signs monitoring, PO intake, daily FH checks  - reviewed fall precautions w/ patient  - gyn team to follow along, spectra [id number] pager [phone]  - will f/u TTE    Amyeo Afroz Jereen, PGY-2  Seen and discussed Dr Bailey

## 2023-02-27 NOTE — ED CDU PROVIDER DISPOSITION NOTE - ATTENDING APP SHARED VISIT CONTRIBUTION OF CARE
Attending Contribution to Care: I personally have seen and examined this patient.  Physician assistant note reviewed and agree on plan of care and except where noted. Patient re-evaluated and doing well.  No acute issues at  this time.  Lab and radiology tests reviewed with patient and/or family.  Patient stable for discharge with nl work up.  pt with soft bp but her bp, no events on cr monitor, echo if nl for dc seen by ob/gyn.

## 2023-02-27 NOTE — ED PROVIDER NOTE - PHYSICAL EXAMINATION
General:  non-toxic, NAD  HEENT:  NCAT, PERRL  Cardiac:  RRR, no murmurs, 2+ peripheral pulses  Chest:  CTA  Abdomen:  soft, non-distended, bowel sounds present, no ttp, no rebound or guarding  Extremities:  no peripheral edema, calf tenderness, or leg size discrepancies  Skin:  no rashes  Neuro:  AAOx4, 5+motor, sensory grossly intact  Psych:  mood and affect appropriate

## 2023-02-27 NOTE — CONSULT NOTE ADULT - ATTENDING COMMENTS
Agree w/ above. 39yo P0 at 15wk with syncopal episode this AM. No cardiac hx, uncomplicated pregnancy so far. Now feeling back at baseline. Did not eat/drink this AM. No accompanying sx of chest pain, SOB, palpitations. VSS. Labs unremarkable, EKG wnl, plan for echo.     Ambrosio GARCIA

## 2023-02-27 NOTE — ED CDU PROVIDER DISPOSITION NOTE - CLINICAL COURSE
39 yo F with no reported PMH 15 weeks pregnant presents sp syncopal episode x today.  Patient woke up around 630AM went to the kitchen was making coffee stood for about 10 to 15 minutes felt dizzy all of a sudden and then lost consciousness,  was nearby and was able to guide the patient to the chair then to the floor. Dizziness followed by LOC for 10-20 seconds this AM. Reports running errands and not having any breakfast prior to this episode. Denies any h/o of prior syncopal sxs in or outside of pregnancy. Denies nausea, vomiting, fever, chills, chest pain, SOB, cough, abd pain, diarrhea, headache, changes in vision/speech, urinary complaints, vaginal complaints, pelvic pain, back/neck pain.  In the ED, pt with stable VS. Labs non actionable. Pt was seen by OB, -160 on sono. They suspect sxs 2/2 vasovagal syncope. ED recommending tele and ECHO.   In the CDU***** 39 yo F with no reported PMH 15 weeks pregnant presents sp syncopal episode x today.  Patient woke up around 630AM went to the kitchen was making coffee stood for about 10 to 15 minutes felt dizzy all of a sudden and then lost consciousness,  was nearby and was able to guide the patient to the chair then to the floor. Dizziness followed by LOC for 10-20 seconds this AM. Reports running errands and not having any breakfast prior to this episode. Denies any h/o of prior syncopal sxs in or outside of pregnancy. Denies nausea, vomiting, fever, chills, chest pain, SOB, cough, abd pain, diarrhea, headache, changes in vision/speech, urinary complaints, vaginal complaints, pelvic pain, back/neck pain.  In the ED, pt with stable VS. Labs non actionable. Pt was seen by OB, -160 on sono. They suspect sxs 2/2 vasovagal syncope. ED recommending tele and ECHO.   In the CDU, no events on tele. Pt without new symptoms. Ambulatory without difficulty and tolerating PO without issue. TTE non-actionable. Results d/w OBGYN, cleared for d/c to f/u in office. Pt reports she has an appointment on Monday. Pt remains feeling well and tolerating PO throughout day. Denies any dizziness, CP, palpitations, SOB, abdominal pain. Tolerating PO. Strict return precautions discussed. Discharge d/w Dr. Rehman.

## 2023-02-27 NOTE — ED CDU PROVIDER DISPOSITION NOTE - NS ED ATTENDING STATEMENT MOD
This was a shared visit with the PAN. I reviewed and verified the documentation and independently performed the documented:

## 2023-02-27 NOTE — ED ADULT NURSE NOTE - OBJECTIVE STATEMENT
Pt reports syncopal episode in AM X1. On assessment pt is AXOX4, not in any distress, no c/o pain, nausea or vomiting, no fever. VSS

## 2023-02-28 VITALS — DIASTOLIC BLOOD PRESSURE: 59 MMHG | SYSTOLIC BLOOD PRESSURE: 91 MMHG

## 2023-02-28 LAB
CULTURE RESULTS: SIGNIFICANT CHANGE UP
SPECIMEN SOURCE: SIGNIFICANT CHANGE UP

## 2023-02-28 PROCEDURE — 99238 HOSP IP/OBS DSCHRG MGMT 30/<: CPT

## 2023-02-28 PROCEDURE — 86850 RBC ANTIBODY SCREEN: CPT

## 2023-02-28 PROCEDURE — 87086 URINE CULTURE/COLONY COUNT: CPT

## 2023-02-28 PROCEDURE — 83605 ASSAY OF LACTIC ACID: CPT

## 2023-02-28 PROCEDURE — 84132 ASSAY OF SERUM POTASSIUM: CPT

## 2023-02-28 PROCEDURE — 93005 ELECTROCARDIOGRAM TRACING: CPT

## 2023-02-28 PROCEDURE — 36415 COLL VENOUS BLD VENIPUNCTURE: CPT

## 2023-02-28 PROCEDURE — 84484 ASSAY OF TROPONIN QUANT: CPT

## 2023-02-28 PROCEDURE — 84295 ASSAY OF SERUM SODIUM: CPT

## 2023-02-28 PROCEDURE — 93306 TTE W/DOPPLER COMPLETE: CPT

## 2023-02-28 PROCEDURE — G0378: CPT

## 2023-02-28 PROCEDURE — 86901 BLOOD TYPING SEROLOGIC RH(D): CPT

## 2023-02-28 PROCEDURE — 83880 ASSAY OF NATRIURETIC PEPTIDE: CPT

## 2023-02-28 PROCEDURE — 84100 ASSAY OF PHOSPHORUS: CPT

## 2023-02-28 PROCEDURE — 82962 GLUCOSE BLOOD TEST: CPT

## 2023-02-28 PROCEDURE — 96360 HYDRATION IV INFUSION INIT: CPT | Mod: XU

## 2023-02-28 PROCEDURE — 86900 BLOOD TYPING SEROLOGIC ABO: CPT

## 2023-02-28 PROCEDURE — 87637 SARSCOV2&INF A&B&RSV AMP PRB: CPT

## 2023-02-28 PROCEDURE — 82947 ASSAY GLUCOSE BLOOD QUANT: CPT

## 2023-02-28 PROCEDURE — 84443 ASSAY THYROID STIM HORMONE: CPT

## 2023-02-28 PROCEDURE — 96361 HYDRATE IV INFUSION ADD-ON: CPT

## 2023-02-28 PROCEDURE — 76376 3D RENDER W/INTRP POSTPROCES: CPT | Mod: 26

## 2023-02-28 PROCEDURE — 83735 ASSAY OF MAGNESIUM: CPT

## 2023-02-28 PROCEDURE — 82803 BLOOD GASES ANY COMBINATION: CPT

## 2023-02-28 PROCEDURE — 85018 HEMOGLOBIN: CPT

## 2023-02-28 PROCEDURE — 93356 MYOCRD STRAIN IMG SPCKL TRCK: CPT

## 2023-02-28 PROCEDURE — 93306 TTE W/DOPPLER COMPLETE: CPT | Mod: 26

## 2023-02-28 PROCEDURE — 85730 THROMBOPLASTIN TIME PARTIAL: CPT

## 2023-02-28 PROCEDURE — 81003 URINALYSIS AUTO W/O SCOPE: CPT

## 2023-02-28 PROCEDURE — 99284 EMERGENCY DEPT VISIT MOD MDM: CPT | Mod: 25

## 2023-02-28 PROCEDURE — 82330 ASSAY OF CALCIUM: CPT

## 2023-02-28 PROCEDURE — 85014 HEMATOCRIT: CPT

## 2023-02-28 PROCEDURE — 80053 COMPREHEN METABOLIC PANEL: CPT

## 2023-02-28 PROCEDURE — 82435 ASSAY OF BLOOD CHLORIDE: CPT

## 2023-02-28 PROCEDURE — 85610 PROTHROMBIN TIME: CPT

## 2023-02-28 PROCEDURE — 76376 3D RENDER W/INTRP POSTPROCES: CPT

## 2023-02-28 PROCEDURE — 85025 COMPLETE CBC W/AUTO DIFF WBC: CPT

## 2023-02-28 RX ADMIN — SODIUM CHLORIDE 100 MILLILITER(S): 9 INJECTION INTRAMUSCULAR; INTRAVENOUS; SUBCUTANEOUS at 06:15

## 2023-02-28 NOTE — ED CDU PROVIDER SUBSEQUENT DAY NOTE - HISTORY
CDU PROGRESS NOTE MELANIA TAVARES: Pt resting comfortably, feeling well without complaint. NAD, VSS. No events on telemetry. Will continue to monitor, plan for TTE in am.

## 2023-02-28 NOTE — ED CDU PROVIDER SUBSEQUENT DAY NOTE - PROGRESS NOTE DETAILS
Patient seen at bedside with Dr. Rehman, reports she is feeling well. Pt is tolerating PO. Ambulatory to bathroom without difficulty. Pt with soft BP but asymptomatic, reports baseline systolic is 90s-100s. No events on tele. TTE performed, pending results. FHR + 166bpm TTE non-actionable. Results d/w OBGYN, cleared for d/c to f/u in office. Pt reports she has an appointment on Monday. Pt remains feeling well and tolerating PO. Denies any dizziness, CP, palpitations, SOB, abdominal pain. Tolerating PO. Strict return precautions discussed. Discharge d/w Dr. Rehman.

## 2023-02-28 NOTE — ED CDU PROVIDER SUBSEQUENT DAY NOTE - ATTENDING APP SHARED VISIT CONTRIBUTION OF CARE
I personally have seen and examined this patient.  Physician assistant note reviewed and agree on plan of care and except where noted. Patient re-evaluated and doing well.  No acute issues at  this time.  Lab and radiology tests reviewed with patient and/or family.  Patient stable for discharge with nl work up.  pt with soft bp but her bp, no events on cr monitor, echo if nl for dc seen by ob/gyn.

## 2023-02-28 NOTE — ED ADULT NURSE REASSESSMENT NOTE - NS ED NURSE REASSESS COMMENT FT1
Received from [facility] awake, alert and orientedx4. Denies any pain or discomfort. Denies chest pain or dizziness. Will monitor.
Received report from Lilliana LUND. Patient resting comfortably in stretcher. A&Ox4. Patient in no acute distress. Denies chest pain, SOB, headache, N/V/D, abdominal pain, fever/chills. Patient pending an echocardiogram. Plan of care discussed. Safety and comfort measures maintained.
Pt received from KEVON Weston. Pt oriented to CDU & plan of care was discussed. Pt A&O x 4. Pt in CDU for tele, TTE. Pt denies any nausea, vomiting, chest pain, SOB, dizziness or palpitations as of now. Pt on a cardiac monitor in NSR, HR in 80's. V/S stable, pt afebrile,  IV in place, patent and free of signs of infiltration. Pt resting in bed. Safety & comfort measures maintained. Call bell in reach. Will continue to monitor.

## 2023-02-28 NOTE — CHART NOTE - NSCHARTNOTEFT_GEN_A_CORE
ALINA LEGGETT | [id number] | [facility] CDU1 47    Subjective: Pt assessed at bedside. Doing well. No complaints. Ambulating, tolerating PO, voiding.  Patient denies lightheadedness, dizziness, chest pain, shortness of breath, fevers, chills, nausea, vomiting, diarrhea.    Vital Signs Last 24 Hrs  T(C): 36.8 (2023 04:23), Max: 36.9 (2023 00:22)  T(F): 98.2 (2023 04:23), Max: 98.4 (2023 00:22)  HR: 87 (:23) (75 - 100)  BP: 99/61 (2023 04:23) (93/55 - 107/70)  BP(mean): 76 (2023 18:05) (66 - 76)  RR: 18 (:) (18 - 19)  SpO2: 100% (2023 04:23) (97% - 100%)    Parameters below as of 2023 04:23  Patient On (Oxygen Delivery Method): room air      I&O's Summary                          12.5   11.82 )-----------( 358      ( 2023 14:56 )             36.4         136  |  102  |  9   ----------------------------<  103<H>  3.7   |  20<L>  |  0.49<L>    Ca    9.4      2023 14:56  Phos  3.9       Mg     2.3         TPro  7.4  /  Alb  4.5  /  TBili  0.3  /  DBili  x   /  AST  16  /  ALT  12  /  AlkPhos  69  27   PT/INR - ( 2023 14:56 )   PT: 11.6 sec;   INR: 1.00 ratio         PTT - ( 2023 14:56 )  PTT:23.3 sec    PHYSICAL EXAM:  Gen: NAD  Abdomen: soft, nontender, nondistended    A/P: 37yo  at GA 15wk1d presents for evaluation of syncope with LOC. + presyncopal symptoms suggestive of vasovagal syncope in pregnancy. VSS and patient currently w/out complaints, benign physical exam. Patient overall doing well:    - Repeat FH check  - will f/u TTE results    Amyeo Afroz Jereen, PGY-2

## 2023-03-01 ENCOUNTER — NON-APPOINTMENT (OUTPATIENT)
Age: 38
End: 2023-03-01

## 2023-03-06 ENCOUNTER — APPOINTMENT (OUTPATIENT)
Dept: OBGYN | Facility: CLINIC | Age: 38
End: 2023-03-06
Payer: COMMERCIAL

## 2023-03-06 VITALS — DIASTOLIC BLOOD PRESSURE: 66 MMHG | WEIGHT: 98 LBS | BODY MASS INDEX: 19.79 KG/M2 | SYSTOLIC BLOOD PRESSURE: 100 MMHG

## 2023-03-06 PROCEDURE — 0502F SUBSEQUENT PRENATAL CARE: CPT

## 2023-03-07 PROBLEM — Z78.9 OTHER SPECIFIED HEALTH STATUS: Chronic | Status: ACTIVE | Noted: 2023-02-27

## 2023-03-09 LAB
AFP MOM: 0.97
AFP VALUE: 44.6 NG/ML
ALPHA FETOPROTEIN SERUM COMMENT: NORMAL
ALPHA FETOPROTEIN SERUM INTERPRETATION: NORMAL
ALPHA FETOPROTEIN SERUM RESULTS: NORMAL
ALPHA FETOPROTEIN SERUM TEST RESULTS: NORMAL
GESTATIONAL AGE BASED ON: NORMAL
GESTATIONAL AGE ON COLLECTION DATE: 15.9 WEEKS
INSULIN DEP DIABETES: NO
MATERNAL AGE AT EDD AFP: 37.5 YR
MULTIPLE GESTATION: NO
OSBR RISK 1 IN: NORMAL
RACE: NORMAL
WEIGHT AFP: 98 LBS

## 2023-03-13 ENCOUNTER — NON-APPOINTMENT (OUTPATIENT)
Age: 38
End: 2023-03-13

## 2023-03-20 ENCOUNTER — NON-APPOINTMENT (OUTPATIENT)
Age: 38
End: 2023-03-20

## 2023-03-27 ENCOUNTER — APPOINTMENT (OUTPATIENT)
Dept: ANTEPARTUM | Facility: CLINIC | Age: 38
End: 2023-03-27
Payer: COMMERCIAL

## 2023-03-27 ENCOUNTER — ASOB RESULT (OUTPATIENT)
Age: 38
End: 2023-03-27

## 2023-03-27 ENCOUNTER — APPOINTMENT (OUTPATIENT)
Dept: OBGYN | Facility: CLINIC | Age: 38
End: 2023-03-27
Payer: COMMERCIAL

## 2023-03-27 VITALS — BODY MASS INDEX: 20.48 KG/M2 | DIASTOLIC BLOOD PRESSURE: 56 MMHG | WEIGHT: 101.38 LBS | SYSTOLIC BLOOD PRESSURE: 92 MMHG

## 2023-03-27 PROCEDURE — 81003 URINALYSIS AUTO W/O SCOPE: CPT | Mod: QW

## 2023-03-27 PROCEDURE — 76811 OB US DETAILED SNGL FETUS: CPT

## 2023-03-27 PROCEDURE — 0502F SUBSEQUENT PRENATAL CARE: CPT

## 2023-04-06 ENCOUNTER — ASOB RESULT (OUTPATIENT)
Age: 38
End: 2023-04-06

## 2023-04-06 ENCOUNTER — APPOINTMENT (OUTPATIENT)
Dept: ANTEPARTUM | Facility: CLINIC | Age: 38
End: 2023-04-06
Payer: COMMERCIAL

## 2023-04-06 PROCEDURE — 76816 OB US FOLLOW-UP PER FETUS: CPT

## 2023-04-28 ENCOUNTER — NON-APPOINTMENT (OUTPATIENT)
Age: 38
End: 2023-04-28

## 2023-05-01 ENCOUNTER — APPOINTMENT (OUTPATIENT)
Dept: OBGYN | Facility: CLINIC | Age: 38
End: 2023-05-01
Payer: COMMERCIAL

## 2023-05-01 VITALS — DIASTOLIC BLOOD PRESSURE: 65 MMHG | WEIGHT: 106.38 LBS | SYSTOLIC BLOOD PRESSURE: 99 MMHG | BODY MASS INDEX: 21.49 KG/M2

## 2023-05-01 PROCEDURE — 81003 URINALYSIS AUTO W/O SCOPE: CPT | Mod: QW

## 2023-05-01 PROCEDURE — 0502F SUBSEQUENT PRENATAL CARE: CPT

## 2023-05-02 LAB
BASOPHILS # BLD AUTO: 0.03 K/UL
BASOPHILS NFR BLD AUTO: 0.3 %
EOSINOPHIL # BLD AUTO: 0.1 K/UL
EOSINOPHIL NFR BLD AUTO: 0.9 %
GLUCOSE 1H P 50 G GLC PO SERPL-MCNC: 106 MG/DL
HCT VFR BLD CALC: 32.7 %
HGB BLD-MCNC: 10.9 G/DL
HIV1+2 AB SPEC QL IA.RAPID: NONREACTIVE
IMM GRANULOCYTES NFR BLD AUTO: 0.4 %
LYMPHOCYTES # BLD AUTO: 1.84 K/UL
LYMPHOCYTES NFR BLD AUTO: 17.1 %
MAN DIFF?: NORMAL
MCHC RBC-ENTMCNC: 30.8 PG
MCHC RBC-ENTMCNC: 33.3 GM/DL
MCV RBC AUTO: 92.4 FL
MONOCYTES # BLD AUTO: 0.82 K/UL
MONOCYTES NFR BLD AUTO: 7.6 %
NEUTROPHILS # BLD AUTO: 7.91 K/UL
NEUTROPHILS NFR BLD AUTO: 73.7 %
PLATELET # BLD AUTO: 362 K/UL
RBC # BLD: 3.54 M/UL
RBC # FLD: 13.2 %
T PALLIDUM AB SER QL IA: NEGATIVE
WBC # FLD AUTO: 10.74 K/UL

## 2023-05-26 ENCOUNTER — NON-APPOINTMENT (OUTPATIENT)
Age: 38
End: 2023-05-26

## 2023-05-30 ENCOUNTER — APPOINTMENT (OUTPATIENT)
Dept: OBGYN | Facility: CLINIC | Age: 38
End: 2023-05-30
Payer: COMMERCIAL

## 2023-05-30 VITALS
DIASTOLIC BLOOD PRESSURE: 67 MMHG | SYSTOLIC BLOOD PRESSURE: 102 MMHG | BODY MASS INDEX: 21.84 KG/M2 | WEIGHT: 108.13 LBS

## 2023-05-30 PROCEDURE — 0502F SUBSEQUENT PRENATAL CARE: CPT

## 2023-05-30 PROCEDURE — 81003 URINALYSIS AUTO W/O SCOPE: CPT | Mod: QW

## 2023-06-02 DIAGNOSIS — N64.59 OTHER SIGNS AND SYMPTOMS IN BREAST: ICD-10-CM

## 2023-06-27 ENCOUNTER — NON-APPOINTMENT (OUTPATIENT)
Age: 38
End: 2023-06-27

## 2023-06-27 ENCOUNTER — ASOB RESULT (OUTPATIENT)
Age: 38
End: 2023-06-27

## 2023-06-27 ENCOUNTER — APPOINTMENT (OUTPATIENT)
Dept: ANTEPARTUM | Facility: CLINIC | Age: 38
End: 2023-06-27
Payer: COMMERCIAL

## 2023-06-27 ENCOUNTER — APPOINTMENT (OUTPATIENT)
Dept: OBGYN | Facility: CLINIC | Age: 38
End: 2023-06-27
Payer: COMMERCIAL

## 2023-06-27 VITALS — BODY MASS INDEX: 22.82 KG/M2 | DIASTOLIC BLOOD PRESSURE: 62 MMHG | SYSTOLIC BLOOD PRESSURE: 110 MMHG | WEIGHT: 113 LBS

## 2023-06-27 PROCEDURE — 81003 URINALYSIS AUTO W/O SCOPE: CPT | Mod: QW

## 2023-06-27 PROCEDURE — 76816 OB US FOLLOW-UP PER FETUS: CPT

## 2023-06-27 PROCEDURE — 76818 FETAL BIOPHYS PROFILE W/NST: CPT | Mod: 59

## 2023-06-27 PROCEDURE — 0502F SUBSEQUENT PRENATAL CARE: CPT

## 2023-07-12 ENCOUNTER — NON-APPOINTMENT (OUTPATIENT)
Age: 38
End: 2023-07-12

## 2023-07-17 ENCOUNTER — APPOINTMENT (OUTPATIENT)
Dept: OBGYN | Facility: CLINIC | Age: 38
End: 2023-07-17
Payer: COMMERCIAL

## 2023-07-17 ENCOUNTER — NON-APPOINTMENT (OUTPATIENT)
Age: 38
End: 2023-07-17

## 2023-07-17 VITALS — WEIGHT: 117 LBS | SYSTOLIC BLOOD PRESSURE: 116 MMHG | BODY MASS INDEX: 23.63 KG/M2 | DIASTOLIC BLOOD PRESSURE: 74 MMHG

## 2023-07-17 PROCEDURE — 0502F SUBSEQUENT PRENATAL CARE: CPT

## 2023-07-17 PROCEDURE — 90471 IMMUNIZATION ADMIN: CPT

## 2023-07-17 PROCEDURE — 90715 TDAP VACCINE 7 YRS/> IM: CPT

## 2023-07-17 PROCEDURE — 81003 URINALYSIS AUTO W/O SCOPE: CPT | Mod: QW

## 2023-07-19 ENCOUNTER — ASOB RESULT (OUTPATIENT)
Age: 38
End: 2023-07-19

## 2023-07-19 ENCOUNTER — APPOINTMENT (OUTPATIENT)
Dept: ANTEPARTUM | Facility: CLINIC | Age: 38
End: 2023-07-19
Payer: COMMERCIAL

## 2023-07-19 PROCEDURE — 76819 FETAL BIOPHYS PROFIL W/O NST: CPT | Mod: 59

## 2023-07-19 PROCEDURE — 99212 OFFICE O/P EST SF 10 MIN: CPT | Mod: 25

## 2023-07-19 PROCEDURE — 76816 OB US FOLLOW-UP PER FETUS: CPT

## 2023-07-19 PROCEDURE — 99202 OFFICE O/P NEW SF 15 MIN: CPT | Mod: 25

## 2023-07-25 ENCOUNTER — NON-APPOINTMENT (OUTPATIENT)
Age: 38
End: 2023-07-25

## 2023-07-25 ENCOUNTER — APPOINTMENT (OUTPATIENT)
Dept: OBGYN | Facility: CLINIC | Age: 38
End: 2023-07-25
Payer: COMMERCIAL

## 2023-07-25 VITALS — BODY MASS INDEX: 24.24 KG/M2 | WEIGHT: 120 LBS | SYSTOLIC BLOOD PRESSURE: 108 MMHG | DIASTOLIC BLOOD PRESSURE: 70 MMHG

## 2023-07-25 PROCEDURE — 0502F SUBSEQUENT PRENATAL CARE: CPT

## 2023-07-25 PROCEDURE — 59426 ANTEPARTUM CARE ONLY: CPT

## 2023-07-25 PROCEDURE — 81003 URINALYSIS AUTO W/O SCOPE: CPT | Mod: QW

## 2023-07-26 ENCOUNTER — NON-APPOINTMENT (OUTPATIENT)
Age: 38
End: 2023-07-26

## 2023-07-26 ENCOUNTER — APPOINTMENT (OUTPATIENT)
Dept: ANTEPARTUM | Facility: CLINIC | Age: 38
End: 2023-07-26

## 2023-07-27 ENCOUNTER — NON-APPOINTMENT (OUTPATIENT)
Age: 38
End: 2023-07-27

## 2023-07-28 DIAGNOSIS — O35.9XX0 MATERNAL CARE FOR (SUSPECTED) FETAL ABNORMALITY AND DAMAGE, UNSPECIFIED, NOT APPLICABLE OR UNSPECIFIED: ICD-10-CM

## 2023-08-01 ENCOUNTER — ASOB RESULT (OUTPATIENT)
Age: 38
End: 2023-08-01

## 2023-08-01 ENCOUNTER — APPOINTMENT (OUTPATIENT)
Dept: ANTEPARTUM | Facility: CLINIC | Age: 38
End: 2023-08-01
Payer: COMMERCIAL

## 2023-08-01 ENCOUNTER — NON-APPOINTMENT (OUTPATIENT)
Age: 38
End: 2023-08-01

## 2023-08-01 PROCEDURE — 76819 FETAL BIOPHYS PROFIL W/O NST: CPT

## 2023-08-01 PROCEDURE — 76815 OB US LIMITED FETUS(S): CPT

## 2023-08-01 PROCEDURE — ZZZZZ: CPT

## 2023-08-02 ENCOUNTER — APPOINTMENT (OUTPATIENT)
Dept: PEDIATRIC UROLOGY | Facility: CLINIC | Age: 38
End: 2023-08-02
Payer: COMMERCIAL

## 2023-08-02 ENCOUNTER — APPOINTMENT (OUTPATIENT)
Dept: OBGYN | Facility: CLINIC | Age: 38
End: 2023-08-02

## 2023-08-02 DIAGNOSIS — Q62.0 CONGENITAL HYDRONEPHROSIS: ICD-10-CM

## 2023-08-02 PROCEDURE — 99244 OFF/OP CNSLTJ NEW/EST MOD 40: CPT | Mod: 95

## 2023-08-02 NOTE — ASSESSMENT
[FreeTextEntry1] : The fetus has significnat left sided hydronephrosis and persistence of a distended bladder.  I discussed the anatomy using diagrams as well as the possible causes that will be determined after birth and the possible testing.  I recommended maintaining the pregnancy and avoiding early delivery given normal levels of amniotic fluid but to deliver at [facility] so that imaging can take place postpartum.  The  care will include: 1. prophylactic Amoxil at 15 mg/kg starting in the hospital 2. renal and bladder ultrasound as well as contrast VCUG is needed after birth at the hospital.    Further management, including possible surgical intervention, will depend on the results of these studies. All questions were answered.

## 2023-08-02 NOTE — HISTORY OF PRESENT ILLNESS
[TextBox_4] : ALINA is here for an initial consultation.  She is 36 weeks pregnant with her first pregnancy conceived naturally.  Left sided hydronephrosis was detected in utero at 32 weeks and was 1.7 cm yesterday and the bladder remains distended on each exam. Amniotic fluid levels are normal. No other anomalies have been

## 2023-08-08 ENCOUNTER — ASOB RESULT (OUTPATIENT)
Age: 38
End: 2023-08-08

## 2023-08-08 ENCOUNTER — APPOINTMENT (OUTPATIENT)
Dept: ANTEPARTUM | Facility: CLINIC | Age: 38
End: 2023-08-08
Payer: COMMERCIAL

## 2023-08-08 PROCEDURE — 76816 OB US FOLLOW-UP PER FETUS: CPT

## 2023-08-08 PROCEDURE — 76818 FETAL BIOPHYS PROFILE W/NST: CPT | Mod: 59

## 2023-08-08 PROCEDURE — 76820 UMBILICAL ARTERY ECHO: CPT | Mod: 59

## 2023-08-08 PROCEDURE — ZZZZZ: CPT

## 2023-08-09 ENCOUNTER — INPATIENT (INPATIENT)
Facility: HOSPITAL | Age: 38
LOS: 1 days | Discharge: HOME | End: 2023-08-11
Attending: OBSTETRICS & GYNECOLOGY | Admitting: OBSTETRICS & GYNECOLOGY

## 2023-08-09 ENCOUNTER — TRANSCRIPTION ENCOUNTER (OUTPATIENT)
Age: 38
End: 2023-08-09

## 2023-08-09 VITALS
HEART RATE: 68 BPM | TEMPERATURE: 98 F | SYSTOLIC BLOOD PRESSURE: 113 MMHG | RESPIRATION RATE: 16 BRPM | DIASTOLIC BLOOD PRESSURE: 63 MMHG

## 2023-08-09 DIAGNOSIS — O26.899 OTHER SPECIFIED PREGNANCY RELATED CONDITIONS, UNSPECIFIED TRIMESTER: ICD-10-CM

## 2023-08-09 LAB
BASOPHILS # BLD AUTO: 0.04 K/UL — SIGNIFICANT CHANGE UP (ref 0–0.2)
BASOPHILS NFR BLD AUTO: 0.4 % — SIGNIFICANT CHANGE UP (ref 0–2)
BLD GP AB SCN SERPL QL: NEGATIVE — SIGNIFICANT CHANGE UP
EOSINOPHIL # BLD AUTO: 0.07 K/UL — SIGNIFICANT CHANGE UP (ref 0–0.5)
EOSINOPHIL NFR BLD AUTO: 0.7 % — SIGNIFICANT CHANGE UP (ref 0–6)
HCT VFR BLD CALC: 35.5 % — SIGNIFICANT CHANGE UP (ref 34.5–45)
HGB BLD-MCNC: 12.1 G/DL — SIGNIFICANT CHANGE UP (ref 11.5–15.5)
IANC: 6.01 K/UL — SIGNIFICANT CHANGE UP (ref 1.8–7.4)
IMM GRANULOCYTES NFR BLD AUTO: 0.5 % — SIGNIFICANT CHANGE UP (ref 0–0.9)
LYMPHOCYTES # BLD AUTO: 2.9 K/UL — SIGNIFICANT CHANGE UP (ref 1–3.3)
LYMPHOCYTES # BLD AUTO: 29.1 % — SIGNIFICANT CHANGE UP (ref 13–44)
MCHC RBC-ENTMCNC: 29.7 PG — SIGNIFICANT CHANGE UP (ref 27–34)
MCHC RBC-ENTMCNC: 34.1 GM/DL — SIGNIFICANT CHANGE UP (ref 32–36)
MCV RBC AUTO: 87 FL — SIGNIFICANT CHANGE UP (ref 80–100)
MONOCYTES # BLD AUTO: 0.89 K/UL — SIGNIFICANT CHANGE UP (ref 0–0.9)
MONOCYTES NFR BLD AUTO: 8.9 % — SIGNIFICANT CHANGE UP (ref 2–14)
NEUTROPHILS # BLD AUTO: 6.01 K/UL — SIGNIFICANT CHANGE UP (ref 1.8–7.4)
NEUTROPHILS NFR BLD AUTO: 60.4 % — SIGNIFICANT CHANGE UP (ref 43–77)
NRBC # BLD: 0 /100 WBCS — SIGNIFICANT CHANGE UP (ref 0–0)
NRBC # FLD: 0 K/UL — SIGNIFICANT CHANGE UP (ref 0–0)
PLATELET # BLD AUTO: 329 K/UL — SIGNIFICANT CHANGE UP (ref 150–400)
RBC # BLD: 4.08 M/UL — SIGNIFICANT CHANGE UP (ref 3.8–5.2)
RBC # FLD: 13 % — SIGNIFICANT CHANGE UP (ref 10.3–14.5)
RH IG SCN BLD-IMP: POSITIVE — SIGNIFICANT CHANGE UP
RH IG SCN BLD-IMP: POSITIVE — SIGNIFICANT CHANGE UP
T PALLIDUM AB TITR SER: NEGATIVE — SIGNIFICANT CHANGE UP
WBC # BLD: 9.96 K/UL — SIGNIFICANT CHANGE UP (ref 3.8–10.5)
WBC # FLD AUTO: 9.96 K/UL — SIGNIFICANT CHANGE UP (ref 3.8–10.5)

## 2023-08-09 RX ORDER — TETANUS TOXOID, REDUCED DIPHTHERIA TOXOID AND ACELLULAR PERTUSSIS VACCINE, ADSORBED 5; 2.5; 8; 8; 2.5 [IU]/.5ML; [IU]/.5ML; UG/.5ML; UG/.5ML; UG/.5ML
0.5 SUSPENSION INTRAMUSCULAR ONCE
Refills: 0 | Status: DISCONTINUED | OUTPATIENT
Start: 2023-08-09 | End: 2023-08-11

## 2023-08-09 RX ORDER — OXYTOCIN 10 UNIT/ML
41.67 VIAL (ML) INJECTION
Qty: 20 | Refills: 0 | Status: DISCONTINUED | OUTPATIENT
Start: 2023-08-09 | End: 2023-08-10

## 2023-08-09 RX ORDER — CHLORHEXIDINE GLUCONATE 213 G/1000ML
1 SOLUTION TOPICAL DAILY
Refills: 0 | Status: DISCONTINUED | OUTPATIENT
Start: 2023-08-09 | End: 2023-08-09

## 2023-08-09 RX ORDER — OXYTOCIN 10 UNIT/ML
333.33 VIAL (ML) INJECTION
Qty: 20 | Refills: 0 | Status: DISCONTINUED | OUTPATIENT
Start: 2023-08-09 | End: 2023-08-10

## 2023-08-09 RX ORDER — SODIUM CHLORIDE 9 MG/ML
1000 INJECTION, SOLUTION INTRAVENOUS
Refills: 0 | Status: DISCONTINUED | OUTPATIENT
Start: 2023-08-09 | End: 2023-08-09

## 2023-08-09 RX ORDER — KETOROLAC TROMETHAMINE 30 MG/ML
30 SYRINGE (ML) INJECTION ONCE
Refills: 0 | Status: DISCONTINUED | OUTPATIENT
Start: 2023-08-09 | End: 2023-08-09

## 2023-08-09 RX ORDER — DIPHENHYDRAMINE HCL 50 MG
25 CAPSULE ORAL EVERY 6 HOURS
Refills: 0 | Status: DISCONTINUED | OUTPATIENT
Start: 2023-08-09 | End: 2023-08-11

## 2023-08-09 RX ORDER — OXYCODONE HYDROCHLORIDE 5 MG/1
5 TABLET ORAL
Refills: 0 | Status: DISCONTINUED | OUTPATIENT
Start: 2023-08-09 | End: 2023-08-11

## 2023-08-09 RX ORDER — IBUPROFEN 200 MG
1 TABLET ORAL
Qty: 0 | Refills: 0 | DISCHARGE
Start: 2023-08-09

## 2023-08-09 RX ORDER — PRAMOXINE HYDROCHLORIDE 150 MG/15G
1 AEROSOL, FOAM RECTAL EVERY 4 HOURS
Refills: 0 | Status: DISCONTINUED | OUTPATIENT
Start: 2023-08-09 | End: 2023-08-11

## 2023-08-09 RX ORDER — HYDROCORTISONE 1 %
1 OINTMENT (GRAM) TOPICAL EVERY 6 HOURS
Refills: 0 | Status: DISCONTINUED | OUTPATIENT
Start: 2023-08-09 | End: 2023-08-11

## 2023-08-09 RX ORDER — DIBUCAINE 1 %
1 OINTMENT (GRAM) RECTAL EVERY 6 HOURS
Refills: 0 | Status: DISCONTINUED | OUTPATIENT
Start: 2023-08-09 | End: 2023-08-11

## 2023-08-09 RX ORDER — ACETAMINOPHEN 500 MG
975 TABLET ORAL
Refills: 0 | Status: DISCONTINUED | OUTPATIENT
Start: 2023-08-09 | End: 2023-08-11

## 2023-08-09 RX ORDER — OXYCODONE HYDROCHLORIDE 5 MG/1
5 TABLET ORAL ONCE
Refills: 0 | Status: DISCONTINUED | OUTPATIENT
Start: 2023-08-09 | End: 2023-08-11

## 2023-08-09 RX ORDER — IBUPROFEN 200 MG
600 TABLET ORAL EVERY 6 HOURS
Refills: 0 | Status: DISCONTINUED | OUTPATIENT
Start: 2023-08-09 | End: 2023-08-11

## 2023-08-09 RX ORDER — BENZOCAINE 10 %
1 GEL (GRAM) MUCOUS MEMBRANE EVERY 6 HOURS
Refills: 0 | Status: DISCONTINUED | OUTPATIENT
Start: 2023-08-09 | End: 2023-08-11

## 2023-08-09 RX ORDER — MAGNESIUM HYDROXIDE 400 MG/1
30 TABLET, CHEWABLE ORAL
Refills: 0 | Status: DISCONTINUED | OUTPATIENT
Start: 2023-08-09 | End: 2023-08-11

## 2023-08-09 RX ORDER — AER TRAVELER 0.5 G/1
1 SOLUTION RECTAL; TOPICAL EVERY 4 HOURS
Refills: 0 | Status: DISCONTINUED | OUTPATIENT
Start: 2023-08-09 | End: 2023-08-11

## 2023-08-09 RX ORDER — IBUPROFEN 200 MG
600 TABLET ORAL EVERY 6 HOURS
Refills: 0 | Status: COMPLETED | OUTPATIENT
Start: 2023-08-09 | End: 2024-07-07

## 2023-08-09 RX ORDER — ACETAMINOPHEN 500 MG
3 TABLET ORAL
Qty: 0 | Refills: 0 | DISCHARGE
Start: 2023-08-09

## 2023-08-09 RX ORDER — LANOLIN
1 OINTMENT (GRAM) TOPICAL EVERY 6 HOURS
Refills: 0 | Status: DISCONTINUED | OUTPATIENT
Start: 2023-08-09 | End: 2023-08-11

## 2023-08-09 RX ORDER — SODIUM CHLORIDE 9 MG/ML
3 INJECTION INTRAMUSCULAR; INTRAVENOUS; SUBCUTANEOUS EVERY 8 HOURS
Refills: 0 | Status: DISCONTINUED | OUTPATIENT
Start: 2023-08-09 | End: 2023-08-11

## 2023-08-09 RX ORDER — SIMETHICONE 80 MG/1
80 TABLET, CHEWABLE ORAL EVERY 4 HOURS
Refills: 0 | Status: DISCONTINUED | OUTPATIENT
Start: 2023-08-09 | End: 2023-08-11

## 2023-08-09 RX ADMIN — Medication 125 MILLIUNIT(S)/MIN: at 09:17

## 2023-08-09 RX ADMIN — Medication 30 MILLIGRAM(S): at 09:17

## 2023-08-09 RX ADMIN — SODIUM CHLORIDE 3 MILLILITER(S): 9 INJECTION INTRAMUSCULAR; INTRAVENOUS; SUBCUTANEOUS at 17:51

## 2023-08-09 RX ADMIN — SODIUM CHLORIDE 3 MILLILITER(S): 9 INJECTION INTRAMUSCULAR; INTRAVENOUS; SUBCUTANEOUS at 22:00

## 2023-08-09 RX ADMIN — Medication 30 MILLIGRAM(S): at 09:33

## 2023-08-09 NOTE — OB RN DELIVERY SUMMARY - NSSELHIDDEN_OBGYN_ALL_OB_FT
[NS_DeliveryAttending1_OBGYN_ALL_OB_FT:[id number]],[NS_CirculateRN2_OBGYN_ALL_OB_FT:[id number]=]

## 2023-08-09 NOTE — OB RN TRIAGE NOTE - NSMATERNALFETALCONCERNS_OBGYN_ALL_OB_FT
Fetal Alert  7/19/23 - Left hydronephrosis, 14mm with prominent bladder on sono.  Notify peds.  Prenatal pediatric urology consult being scheduled.  -LUIS MANUEL Sharpe  8/1/23 - Fetal mdm held on 7/27/23.  See minutes for details.  Preliminary recommendations from Dr. Maldonado, sono and VCUG inpatient after delivery.  Amoxil prophylaxis. -LUIS MANUEL Sharpe  8/3/23 - Consult done by Dr. Maldonado, recommendation for sono, VCUG, and amoxil inpatient. On sono, enlarged bladder, left renal pelvis 1.7cm, suspicious for posterior urethral valve/reflux. -Yessenia Cooney, KEVONC

## 2023-08-09 NOTE — OB PROVIDER H&P - HISTORY OF PRESENT ILLNESS
Pt. is a 39y/o  EGA 38.2wks reports of leakage of fluid around 345a and painful contractions. Pt. denies vaginal bleeding. Pt. reports good fetal movement.     Fetal Alert  23 - Left hydronephrosis, 14mm with prominent bladder on sono.  Notify peds.  Prenatal pediatric urology consult being scheduled.  -Yessenia Cooney, LUIS MANUEL  23 - Fetal mdm held on 23.  See minutes for details.  Preliminary recommendations from Dr. Maldonado, sono and VCUG inpatient after delivery.  Amoxil prophylaxis. -Yessenia Cooney, LUIS MANUEL  8/3/23 - Consult done by Dr. Maldonado, recommendation for sono, VCUG, and amoxil inpatient. On sono, enlarged bladder, left renal pelvis 1.7cm, suspicious for posterior urethral valve/reflux. -Yessenia Cooney, KEVONC

## 2023-08-09 NOTE — DISCHARGE NOTE OB - PATIENT PORTAL LINK FT
You can access the FollowMyHealth Patient Portal offered by [facility] by registering at the following website: [email or web address]. By joining [employer]’s FollowMyHealth portal, you will also be able to view your health information using other applications (apps) compatible with our system.

## 2023-08-09 NOTE — CHART NOTE - NSCHARTNOTEFT_GEN_A_CORE
Ob  note    Went to examine pt c/o intermittent rectal pressure.  VE 9.5/100/0.  Dr. Grigsby in house repairing laceration in another delivery but was updated.      Vianca Cobb MD

## 2023-08-09 NOTE — OB RN DELIVERY SUMMARY - NS_SEPSISRSKCALC_OBGYN_ALL_OB_FT
EOS calculated successfully. EOS Risk Factor: 0.5/1000 live births ([facility] national incidence); GA=38w2d; Temp=98.2; ROM=4.983; GBS='Negative'; Antibiotics='No antibiotics or any antibiotics < 2 hrs prior to birth'

## 2023-08-09 NOTE — OB PROVIDER H&P - NSLOWPPHRISK_OBGYN_A_OB
No previous uterine incision/[place] Pregnancy/Less than or equal to 4 previous vaginal births/No known bleeding disorder/No history of postpartum hemorrhage/No other PPH risks indicated

## 2023-08-09 NOTE — DISCHARGE NOTE OB - MEDICATION SUMMARY - MEDICATIONS TO TAKE
I will START or STAY ON the medications listed below when I get home from the hospital:    ibuprofen 600 mg oral tablet  -- 1 tab(s) by mouth every 6 hours  -- Indication: For As needed for pain    acetaminophen 325 mg oral tablet  -- 3 tab(s) by mouth every 8 hours  -- Indication: For As needed for pain

## 2023-08-09 NOTE — OB RN DELIVERY SUMMARY - NS_LABORANALGESIA_OBGYN_ALL_OB
Message sent to mother on [employer].    Tonja Franco RN    
Message sent to mother on [address].    Tonja Franco RN    
Mother called back, message was forwarded directly to   
None

## 2023-08-09 NOTE — OB PROVIDER DELIVERY SUMMARY - NSSELHIDDEN_OBGYN_ALL_OB_FT
[NS_DeliveryAttending1_OBGYN_ALL_OB_FT:[id number]] [NS_DeliveryAttending1_OBGYN_ALL_OB_FT:[id number]],[NS_DeliveryAssist1_OBGYN_ALL_OB_FT:[id number]=]

## 2023-08-09 NOTE — OB PROVIDER H&P - NSRISKFACTORSDETA_OBGYN_ALL_OB

## 2023-08-09 NOTE — OB RN TRIAGE NOTE - FALL HARM RISK - UNIVERSAL INTERVENTIONS
Bed in lowest position, wheels locked, appropriate side rails in place/Call bell, personal items and telephone in reach/Instruct patient to call for assistance before getting out of bed or chair/Non-slip footwear when patient is out of bed/[place] to call system/Physically safe environment - no spills, clutter or unnecessary equipment/Purposeful Proactive Rounding/Room/bathroom lighting operational, light cord in reach

## 2023-08-09 NOTE — DISCHARGE NOTE OB - CARE PROVIDER_API CALL
Nadya Zapata  Obstetrics and Gynecology  [address], [address]  [place] [address]  Phone: [phone]  Fax: [phone]  Follow Up Time:

## 2023-08-09 NOTE — OB PROVIDER H&P - ASSESSMENT
Evidence of active labor and rupture of membranes, discussed findings with Dr. Grigsby  -Admit to L&D  -Routine labs ordered   -Expectant management   -NICU fellow made aware   -Peds nursery intern, Dr. Gonzalez, made aware

## 2023-08-09 NOTE — OB NEONATOLOGY/PEDIATRICIAN DELIVERY SUMMARY - NSPEDSNEONOTESA_OBGYN_ALL_OB_FT
38.2 wk male born via  to a 39 y/o  blood type A+ mother. Prenatal history of L hydronephrosis with distended bladder with urology consult (Dr. Maldonado) antepartum recommending after birth to begin prophylactic Amox, obtain VCUG and renal/bladder US, and an inpatient consult will be conducted after studies are completed prior to dc. PNL -/-/NR/I, GBS - on . SROM at 345 with clear fluids. Highest maternal temperature 36.5. Baby emerged vigorous, crying. Cord clamping delayed 60sec.  Infant was brought to radiant warmer and warmed, dried, stimulated and suctioned. HR>100, normal respiratory effort. with APGARS of 9/9.  Mom plans to initiate breastfeeding, declines Hep B vaccine and consents circ.  EOS 0.06.    BW: 2640  : 23  TOB: 0829    Physical Exam:  Gen: NAD, +grimace  HEENT: anterior fontanel open soft and flat, no cleft lip/palate, ears normal set, no ear pits or tags. no lesions in mouth/throat, nares clinically patent  Resp: no increased work of breathing, good air entry b/l, clear to auscultation bilaterally  Cardio: Normal S1/S2, regular rate and rhythm, no murmurs, rubs or gallops  Abd: soft, non tender, non distended, + bowel sounds, umbilical cord with 3 vessels  Neuro: +grasp/suck/vargas, normal tone  Extremities: negative frey and ortolani, moving all extremities, full range of motion x 4, no crepitus  Skin: pink, warm  Genitals: Normal male anatomy, testicles palpable in scrotum b/l, Ranjeet 1, anus patent

## 2023-08-09 NOTE — OB PROVIDER H&P - NSHPPHYSICALEXAM_GEN_ALL_CORE
ICU Vital Signs Last 24 Hrs  T(C): 36.8 (09 Aug 2023 04:56), Max: 36.8 (09 Aug 2023 04:56)  T(F): 98.2 (09 Aug 2023 04:56), Max: 98.2 (09 Aug 2023 04:56)  HR: 68 (09 Aug 2023 05:11) (68 - 68)  BP: 113/63 (09 Aug 2023 05:11) (113/63 - 113/63)  BP(mean): --  ABP: --  ABP(mean): --  RR: 16 (09 Aug 2023 04:56) (16 - 16)  SpO2: --    Abdomen soft nontender  Speculum: Positive pooling, nitrazine, and fern   SVE: 5/80/-2  TAS: Cephalic presentation   Image saved to ASOB   GBS: Neg. (7/25/23)  EFW: 3200gms by leopolds  Category I tracing   Alexsandra every 3mins ICU Vital Signs Last 24 Hrs  T(C): 36.8 (09 Aug 2023 04:56), Max: 36.8 (09 Aug 2023 04:56)  T(F): 98.2 (09 Aug 2023 04:56), Max: 98.2 (09 Aug 2023 04:56)  HR: 68 (09 Aug 2023 05:11) (68 - 68)  BP: 113/63 (09 Aug 2023 05:11) (113/63 - 113/63)  BP(mean): --  ABP: --  ABP(mean): --  RR: 16 (09 Aug 2023 04:56) (16 - 16)  SpO2: --    Abdomen soft nontender  Speculum: Positive pooling, nitrazine, and fern   SVE: 5/80/-2  TAS: Cephalic presentation   Image saved to ASOB   GBS: Neg. (7/25/23)  EFW: 3200gms by leopolds  Reactive NST   Alexsandra every 3mins

## 2023-08-09 NOTE — OB RN TRIAGE NOTE - NS_SISCREENINGSR_GEN_ALL_ED
The glaucoma risk is only for narrow angle glaucoma.  His problem list does not specify.  He would have to check with his optometrist.    The suicidal risk the Zoloft is very low.  I recommend he start and continue medication as advised by his PCP   Negative

## 2023-08-09 NOTE — OB PROVIDER DELIVERY SUMMARY - NSPROVIDERDELIVERYNOTE_OBGYN_ALL_OB_FT
Patient FD and pushing.  Decels with pushing but with recovery to baseline.   live male over intact perineum.  Peds in attendance for category 2 tracing.  Apgaras 9+9.  Placenta spontaneous intact.  Cervix, vagina and perineum inspected.  Uterus firm.  2nd degree laceration repair with 2-0 chromic under local.  .  Mother and baby in stable condition.  BRENDA Grigsby

## 2023-08-09 NOTE — DISCHARGE NOTE OB - MATERIALS PROVIDED
Vaccinations/[facility]  Screening Program/  Immunization Record/Guide to Postpartum Care/[facility] Hearing Screen Program/Shaken Baby Prevention Handout/Birth Certificate Instructions/Tdap Vaccination (VIS Pub Date: 2012)

## 2023-08-10 RX ADMIN — Medication 600 MILLIGRAM(S): at 06:28

## 2023-08-10 RX ADMIN — SODIUM CHLORIDE 3 MILLILITER(S): 9 INJECTION INTRAMUSCULAR; INTRAVENOUS; SUBCUTANEOUS at 06:00

## 2023-08-10 RX ADMIN — SODIUM CHLORIDE 3 MILLILITER(S): 9 INJECTION INTRAMUSCULAR; INTRAVENOUS; SUBCUTANEOUS at 21:28

## 2023-08-10 RX ADMIN — Medication 1 TABLET(S): at 12:21

## 2023-08-10 RX ADMIN — SODIUM CHLORIDE 3 MILLILITER(S): 9 INJECTION INTRAMUSCULAR; INTRAVENOUS; SUBCUTANEOUS at 14:53

## 2023-08-10 NOTE — PROGRESS NOTE ADULT - SUBJECTIVE AND OBJECTIVE BOX
S: Patient doing well. Minimal lochia. Pain controlled.    O: Vital Signs Last 24 Hrs  T(C): 36.7 (10 Aug 2023 06:33), Max: 36.7 (09 Aug 2023 22:35)  T(F): 98 (10 Aug 2023 06:33), Max: 98.1 (09 Aug 2023 22:35)  HR: 70 (10 Aug 2023 06:33) (63 - 76)  BP: 95/55 (10 Aug 2023 06:33) (95/55 - 98/60)  BP(mean): --  RR: 19 (10 Aug 2023 06:33) (17 - 19)  SpO2: 98% (10 Aug 2023 06:33) (98% - 100%)    Parameters below as of 10 Aug 2023 06:33  Patient On (Oxygen Delivery Method): room air        Gen: NAD  Abd: soft, NT, ND, fundus firm below umbilicus  Lochia: moderate  Ext: no tenderness    Labs:                        12.1   9.96  )-----------( 329      ( 09 Aug 2023 05:50 )             35.5       A: 38y PPD#1 s/p  doing well.    Plan: continue pp care C Himanshu

## 2023-08-11 VITALS
DIASTOLIC BLOOD PRESSURE: 61 MMHG | OXYGEN SATURATION: 99 % | TEMPERATURE: 98 F | SYSTOLIC BLOOD PRESSURE: 110 MMHG | HEART RATE: 72 BPM | RESPIRATION RATE: 20 BRPM

## 2023-08-11 RX ADMIN — Medication 600 MILLIGRAM(S): at 08:05

## 2023-08-11 RX ADMIN — SODIUM CHLORIDE 3 MILLILITER(S): 9 INJECTION INTRAMUSCULAR; INTRAVENOUS; SUBCUTANEOUS at 06:54

## 2023-08-11 RX ADMIN — Medication 600 MILLIGRAM(S): at 09:00

## 2023-08-14 PROBLEM — J45.909 UNSPECIFIED ASTHMA, UNCOMPLICATED: Chronic | Status: ACTIVE | Noted: 2023-08-09

## 2023-08-18 ENCOUNTER — APPOINTMENT (OUTPATIENT)
Dept: ANTEPARTUM | Facility: CLINIC | Age: 38
End: 2023-08-18

## 2023-08-20 LAB
GP B STREP DNA SPEC QL NAA+PROBE: NOT DETECTED
SOURCE GBS: NORMAL

## 2023-08-22 ENCOUNTER — APPOINTMENT (OUTPATIENT)
Dept: OBGYN | Facility: CLINIC | Age: 38
End: 2023-08-22
Payer: COMMERCIAL

## 2023-08-22 VITALS
DIASTOLIC BLOOD PRESSURE: 71 MMHG | BODY MASS INDEX: 20.42 KG/M2 | WEIGHT: 101.13 LBS | SYSTOLIC BLOOD PRESSURE: 111 MMHG

## 2023-08-22 PROCEDURE — 0503F POSTPARTUM CARE VISIT: CPT

## 2023-08-22 NOTE — HISTORY OF PRESENT ILLNESS
[Postpartum Follow Up] : postpartum follow up [] : delivered by vaginal delivery [Male] : Delivery History: baby boy [Wt. ___] : weighing [unfilled] [Pertussis Vaccine] : Pertussis vaccine administered [Intended Contraception] : Intended Contraception: [Oral Contraceptives] : oral contraceptives [Mild] : mild vaginal bleeding [Normal] : the vagina was normal [Not Done] : Examination of breasts not done [Doing Well] : is doing well [No Sign of Infection] : is showing no signs of infection [Excellent Pain Control] : has excellent pain control [Complications:___] : no complications [Rhogam] : Rhogam was not administered [Rubella Vaccine] : Rubella vaccine was not administered [BTL] : no tubal ligation [Breastfeeding] : not currently nursing [Resumed Menses] : has not resumed her menses [Resumed [place]] : has not resumed intercourse [Breast Pain] : no breast pain [BreastFeeding Problems] : no breastfeeding problems [S/Sx PP Depression] : no signs/symptoms of postpartum depression [None] : No associated symptoms are reported [Erythema] : not erythematous [Hematoma] : no vaginal hematoma [Abscess Formation] : no vaginal abscess [Healing Well] : is healing well [Infected] : did not appear infected [Dehiscence] : was not dehisced [Cervix Sample Taken] : cervical sample not taken for a Pap smear [FreeTextEntry8] : s/p  2023 Obed Bernabe [id number] at [facility] secondary to concerns about fetal hydronephrosis-seeing Dr muñiz; W/U thus far (-) (no explanation); ; perineum sore, lochia mild, Similac 360 with Iron, Moods OK

## 2023-10-09 ENCOUNTER — APPOINTMENT (OUTPATIENT)
Dept: OBGYN | Facility: CLINIC | Age: 38
End: 2023-10-09
Payer: COMMERCIAL

## 2023-10-09 VITALS — WEIGHT: 108 LBS | BODY MASS INDEX: 21.81 KG/M2 | SYSTOLIC BLOOD PRESSURE: 126 MMHG | DIASTOLIC BLOOD PRESSURE: 77 MMHG

## 2023-10-09 DIAGNOSIS — N91.2 AMENORRHEA, UNSPECIFIED: ICD-10-CM

## 2023-10-09 PROCEDURE — 99213 OFFICE O/P EST LOW 20 MIN: CPT

## 2024-02-17 ENCOUNTER — NON-APPOINTMENT (OUTPATIENT)
Age: 39
End: 2024-02-17

## 2024-04-24 ENCOUNTER — NON-APPOINTMENT (OUTPATIENT)
Age: 39
End: 2024-04-24

## 2024-04-26 NOTE — ED ADULT NURSE NOTE - PAIN: PRESENCE, MLM
Called and confirmed appt with pt for 4/29 @ 10 AM    Electronically signed by Marcela Collazo on 4/26/2024 at 11:15 AM    
denies pain/discomfort (Rating = 0)

## 2024-10-31 ENCOUNTER — APPOINTMENT (OUTPATIENT)
Dept: AFTER HOURS CARE | Facility: EMERGENCY ROOM | Age: 39
End: 2024-10-31
Payer: COMMERCIAL

## 2024-10-31 DIAGNOSIS — J40 BRONCHITIS, NOT SPECIFIED AS ACUTE OR CHRONIC: ICD-10-CM

## 2024-10-31 PROCEDURE — 99214 OFFICE O/P EST MOD 30 MIN: CPT

## 2024-10-31 RX ORDER — ALBUTEROL SULFATE 90 UG/1
108 (90 BASE) INHALANT RESPIRATORY (INHALATION) EVERY 4 HOURS
Qty: 1 | Refills: 0 | Status: ACTIVE | COMMUNITY
Start: 2024-10-31 | End: 1900-01-01

## 2025-02-04 ENCOUNTER — NON-APPOINTMENT (OUTPATIENT)
Age: 40
End: 2025-02-04

## 2025-02-04 ENCOUNTER — LABORATORY RESULT (OUTPATIENT)
Age: 40
End: 2025-02-04

## 2025-02-04 ENCOUNTER — APPOINTMENT (OUTPATIENT)
Dept: OBGYN | Facility: CLINIC | Age: 40
End: 2025-02-04
Payer: COMMERCIAL

## 2025-02-04 VITALS
WEIGHT: 99.38 LBS | DIASTOLIC BLOOD PRESSURE: 75 MMHG | HEIGHT: 59 IN | SYSTOLIC BLOOD PRESSURE: 115 MMHG | BODY MASS INDEX: 20.04 KG/M2

## 2025-02-04 DIAGNOSIS — Z87.42 PERSONAL HISTORY OF OTHER DISEASES OF THE FEMALE GENITAL TRACT: ICD-10-CM

## 2025-02-04 DIAGNOSIS — Z01.419 ENCOUNTER FOR GYNECOLOGICAL EXAMINATION (GENERAL) (ROUTINE) W/OUT ABNORMAL FINDINGS: ICD-10-CM

## 2025-02-04 DIAGNOSIS — Z34.00 ENCOUNTER FOR SUPERVISION OF NORMAL FIRST PREGNANCY, UNSPECIFIED TRIMESTER: ICD-10-CM

## 2025-02-04 DIAGNOSIS — O35.9XX0 MATERNAL CARE FOR (SUSPECTED) FETAL ABNORMALITY AND DAMAGE, UNSPECIFIED, NOT APPLICABLE OR UNSPECIFIED: ICD-10-CM

## 2025-02-04 DIAGNOSIS — N92.6 IRREGULAR MENSTRUATION, UNSPECIFIED: ICD-10-CM

## 2025-02-04 PROCEDURE — G0444 DEPRESSION SCREEN ANNUAL: CPT | Mod: 59

## 2025-02-04 PROCEDURE — 99395 PREV VISIT EST AGE 18-39: CPT

## 2025-02-11 ENCOUNTER — NON-APPOINTMENT (OUTPATIENT)
Age: 40
End: 2025-02-11

## 2025-02-11 LAB
CYTOLOGY CVX/VAG DOC THIN PREP: ABNORMAL
HPV HIGH+LOW RISK DNA PNL CVX: DETECTED

## 2025-03-19 ENCOUNTER — APPOINTMENT (OUTPATIENT)
Dept: OBGYN | Facility: CLINIC | Age: 40
End: 2025-03-19
Payer: COMMERCIAL

## 2025-03-19 VITALS
SYSTOLIC BLOOD PRESSURE: 117 MMHG | WEIGHT: 97 LBS | HEIGHT: 59 IN | DIASTOLIC BLOOD PRESSURE: 75 MMHG | BODY MASS INDEX: 19.56 KG/M2

## 2025-03-19 DIAGNOSIS — R87.612 LOW GRADE SQUAMOUS INTRAEPITHELIAL LESION ON CYTOLOGIC SMEAR OF CERVIX (LGSIL): ICD-10-CM

## 2025-03-19 DIAGNOSIS — B97.7 PAPILLOMAVIRUS AS THE CAUSE OF DISEASES CLASSIFIED ELSEWHERE: ICD-10-CM

## 2025-03-19 PROCEDURE — 57454 BX/CURETT OF CERVIX W/SCOPE: CPT

## 2025-03-20 LAB
HCG UR QL: NEGATIVE
QUALITY CONTROL: YES

## 2025-03-28 LAB — CORE LAB BIOPSY: NORMAL

## 2025-04-03 DIAGNOSIS — N87.1 MODERATE CERVICAL DYSPLASIA: ICD-10-CM

## 2025-05-02 ENCOUNTER — APPOINTMENT (OUTPATIENT)
Dept: AFTER HOURS CARE | Facility: EMERGENCY ROOM | Age: 40
End: 2025-05-02
Payer: COMMERCIAL

## 2025-05-02 PROCEDURE — 99214 OFFICE O/P EST MOD 30 MIN: CPT | Mod: 95

## 2025-05-02 RX ORDER — BENZONATATE 200 MG/1
200 CAPSULE ORAL
Qty: 15 | Refills: 0 | Status: ACTIVE | COMMUNITY
Start: 2025-05-02 | End: 1900-01-01

## 2025-05-02 RX ORDER — LORATADINE 10 MG/1
10 TABLET ORAL DAILY
Qty: 14 | Refills: 0 | Status: ACTIVE | COMMUNITY
Start: 2025-05-02 | End: 1900-01-01

## 2025-05-02 RX ORDER — ALBUTEROL SULFATE 90 UG/1
108 (90 BASE) INHALANT RESPIRATORY (INHALATION)
Qty: 1 | Refills: 1 | Status: ACTIVE | COMMUNITY
Start: 2025-05-02 | End: 1900-01-01

## 2025-05-21 ENCOUNTER — APPOINTMENT (OUTPATIENT)
Dept: OBGYN | Facility: CLINIC | Age: 40
End: 2025-05-21
Payer: COMMERCIAL

## 2025-05-21 VITALS
BODY MASS INDEX: 19.56 KG/M2 | WEIGHT: 97 LBS | DIASTOLIC BLOOD PRESSURE: 82 MMHG | HEIGHT: 59 IN | SYSTOLIC BLOOD PRESSURE: 130 MMHG

## 2025-05-21 DIAGNOSIS — N87.1 MODERATE CERVICAL DYSPLASIA: ICD-10-CM

## 2025-05-21 LAB
HCG UR QL: NEGATIVE
QUALITY CONTROL: YES

## 2025-05-21 PROCEDURE — 57460 BX OF CERVIX W/SCOPE LEEP: CPT

## 2025-05-29 ENCOUNTER — NON-APPOINTMENT (OUTPATIENT)
Age: 40
End: 2025-05-29

## 2025-05-29 LAB — CORE LAB BIOPSY: NORMAL

## 2025-06-27 ENCOUNTER — APPOINTMENT (OUTPATIENT)
Dept: AFTER HOURS CARE | Facility: EMERGENCY ROOM | Age: 40
End: 2025-06-27
Payer: COMMERCIAL

## 2025-06-27 PROBLEM — B34.9 VIRAL SYNDROME: Status: ACTIVE | Noted: 2025-06-27

## 2025-06-27 PROCEDURE — 99215 OFFICE O/P EST HI 40 MIN: CPT | Mod: 95

## 2025-07-02 ENCOUNTER — APPOINTMENT (OUTPATIENT)
Dept: OBGYN | Facility: CLINIC | Age: 40
End: 2025-07-02

## 2025-07-02 ENCOUNTER — APPOINTMENT (OUTPATIENT)
Dept: AFTER HOURS CARE | Facility: EMERGENCY ROOM | Age: 40
End: 2025-07-02

## 2025-07-02 PROCEDURE — 99215 OFFICE O/P EST HI 40 MIN: CPT | Mod: 95

## 2025-07-03 ENCOUNTER — APPOINTMENT (OUTPATIENT)
Dept: AFTER HOURS CARE | Facility: EMERGENCY ROOM | Age: 40
End: 2025-07-03

## 2025-07-03 ENCOUNTER — LABORATORY RESULT (OUTPATIENT)
Age: 40
End: 2025-07-03

## 2025-07-03 LAB
ALBUMIN SERPL ELPH-MCNC: 4.9 G/DL
ALP BLD-CCNC: 76 U/L
ALT SERPL-CCNC: 18 U/L
ANION GAP SERPL CALC-SCNC: 18 MMOL/L
APPEARANCE: CLEAR
AST SERPL-CCNC: 16 U/L
BASOPHILS # BLD AUTO: 0.07 K/UL
BASOPHILS NFR BLD AUTO: 1.1 %
BILIRUB SERPL-MCNC: 0.5 MG/DL
BILIRUBIN URINE: NEGATIVE
BLOOD URINE: ABNORMAL
BUN SERPL-MCNC: 13 MG/DL
CALCIUM SERPL-MCNC: 10 MG/DL
CHLORIDE SERPL-SCNC: 102 MMOL/L
CO2 SERPL-SCNC: 21 MMOL/L
COLOR: YELLOW
CREAT SERPL-MCNC: 0.73 MG/DL
EGFRCR SERPLBLD CKD-EPI 2021: 107 ML/MIN/1.73M2
EOSINOPHIL # BLD AUTO: 0.04 K/UL
EOSINOPHIL NFR BLD AUTO: 0.6 %
GLUCOSE QUALITATIVE U: NEGATIVE MG/DL
GLUCOSE SERPL-MCNC: 98 MG/DL
HCT VFR BLD CALC: 41.7 %
HGB BLD-MCNC: 13.7 G/DL
IMM GRANULOCYTES NFR BLD AUTO: 0.2 %
INFLUENZA A RESULT: NOT DETECTED
INFLUENZA B RESULT: NOT DETECTED
KETONES URINE: 40 MG/DL
LEUKOCYTE ESTERASE URINE: NEGATIVE
LYMPHOCYTES # BLD AUTO: 1.9 K/UL
LYMPHOCYTES NFR BLD AUTO: 30.4 %
MAN DIFF?: NORMAL
MCHC RBC-ENTMCNC: 29.5 PG
MCHC RBC-ENTMCNC: 32.9 G/DL
MCV RBC AUTO: 89.9 FL
MONOCYTES # BLD AUTO: 0.54 K/UL
MONOCYTES NFR BLD AUTO: 8.6 %
NEUTROPHILS # BLD AUTO: 3.7 K/UL
NEUTROPHILS NFR BLD AUTO: 59.1 %
NITRITE URINE: NEGATIVE
PH URINE: 5.5
PLATELET # BLD AUTO: 394 K/UL
POTASSIUM SERPL-SCNC: 3.7 MMOL/L
PROT SERPL-MCNC: 7.9 G/DL
PROTEIN URINE: NORMAL MG/DL
RBC # BLD: 4.64 M/UL
RBC # FLD: 12.9 %
RESP SYN VIRUS RESULT: NOT DETECTED
SARS-COV-2 RESULT: NOT DETECTED
SODIUM SERPL-SCNC: 141 MMOL/L
SPECIFIC GRAVITY URINE: 1.03
TSH SERPL-ACNC: 1.94 UIU/ML
UROBILINOGEN URINE: 0.2 MG/DL
WBC # FLD AUTO: 6.26 K/UL

## 2025-07-04 ENCOUNTER — APPOINTMENT (OUTPATIENT)
Dept: AFTER HOURS CARE | Facility: EMERGENCY ROOM | Age: 40
End: 2025-07-04

## 2025-07-04 ENCOUNTER — APPOINTMENT (OUTPATIENT)
Dept: AFTER HOURS CARE | Facility: EMERGENCY ROOM | Age: 40
End: 2025-07-04
Payer: COMMERCIAL

## 2025-07-04 PROCEDURE — 99215 OFFICE O/P EST HI 40 MIN: CPT | Mod: 95

## 2025-07-10 ENCOUNTER — APPOINTMENT (OUTPATIENT)
Dept: AFTER HOURS CARE | Facility: EMERGENCY ROOM | Age: 40
End: 2025-07-10
Payer: COMMERCIAL

## 2025-07-10 PROCEDURE — 99215 OFFICE O/P EST HI 40 MIN: CPT | Mod: 95

## 2025-08-27 ENCOUNTER — APPOINTMENT (OUTPATIENT)
Dept: OBGYN | Facility: CLINIC | Age: 40
End: 2025-08-27